# Patient Record
Sex: FEMALE | Race: WHITE | NOT HISPANIC OR LATINO | Employment: FULL TIME | URBAN - METROPOLITAN AREA
[De-identification: names, ages, dates, MRNs, and addresses within clinical notes are randomized per-mention and may not be internally consistent; named-entity substitution may affect disease eponyms.]

---

## 2023-05-17 ENCOUNTER — TELEMEDICINE (OUTPATIENT)
Dept: PRIMARY CARE | Facility: CLINIC | Age: 58
End: 2023-05-17
Payer: COMMERCIAL

## 2023-05-17 VITALS
BODY MASS INDEX: 30 KG/M2 | HEIGHT: 62 IN | DIASTOLIC BLOOD PRESSURE: 83 MMHG | SYSTOLIC BLOOD PRESSURE: 118 MMHG | WEIGHT: 163 LBS

## 2023-05-17 DIAGNOSIS — J06.9 URI, ACUTE: ICD-10-CM

## 2023-05-17 DIAGNOSIS — Z20.818 EXPOSURE TO STREP THROAT: Primary | ICD-10-CM

## 2023-05-17 PROBLEM — D50.9 IRON DEFICIENCY ANEMIA: Status: ACTIVE | Noted: 2023-05-17

## 2023-05-17 PROBLEM — M05.79 RHEUMATOID ARTHRITIS INVOLVING MULTIPLE SITES WITH POSITIVE RHEUMATOID FACTOR (MULTI): Status: ACTIVE | Noted: 2022-08-02

## 2023-05-17 PROBLEM — R79.89 LOW VITAMIN B12 LEVEL: Status: ACTIVE | Noted: 2023-05-17

## 2023-05-17 PROBLEM — G40.909 EPILEPSY (MULTI): Status: ACTIVE | Noted: 2023-05-17

## 2023-05-17 PROBLEM — L40.9 PSORIASIS: Status: ACTIVE | Noted: 2023-05-17

## 2023-05-17 PROBLEM — F32.A ANXIETY AND DEPRESSION: Status: ACTIVE | Noted: 2023-05-17

## 2023-05-17 PROBLEM — I10 HYPERTENSION, ESSENTIAL: Status: ACTIVE | Noted: 2023-05-17

## 2023-05-17 PROBLEM — Z86.2 H/O IRON DEFICIENCY ANEMIA: Status: ACTIVE | Noted: 2023-05-17

## 2023-05-17 PROBLEM — F41.9 ANXIETY AND DEPRESSION: Status: ACTIVE | Noted: 2023-05-17

## 2023-05-17 PROBLEM — E88.819 INSULIN RESISTANCE: Status: ACTIVE | Noted: 2023-05-17

## 2023-05-17 PROBLEM — E55.9 VITAMIN D DEFICIENCY: Status: ACTIVE | Noted: 2023-05-17

## 2023-05-17 PROBLEM — N20.0 CALCULUS OF KIDNEY: Status: ACTIVE | Noted: 2023-05-17

## 2023-05-17 PROCEDURE — 99213 OFFICE O/P EST LOW 20 MIN: CPT | Performed by: NURSE PRACTITIONER

## 2023-05-17 RX ORDER — TOPIRAMATE 200 MG/1
TABLET, COATED ORAL
COMMUNITY

## 2023-05-17 RX ORDER — ALBUTEROL SULFATE 90 UG/1
AEROSOL, METERED RESPIRATORY (INHALATION)
COMMUNITY

## 2023-05-17 RX ORDER — AMOXICILLIN AND CLAVULANATE POTASSIUM 875; 125 MG/1; MG/1
875 TABLET, FILM COATED ORAL 2 TIMES DAILY
Qty: 20 TABLET | Refills: 0 | Status: SHIPPED | OUTPATIENT
Start: 2023-05-17 | End: 2023-05-27

## 2023-05-17 RX ORDER — TOPIRAMATE 50 MG/1
50 TABLET, COATED ORAL 2 TIMES DAILY
COMMUNITY

## 2023-05-17 RX ORDER — LACOSAMIDE 100 MG/1
100 TABLET ORAL 2 TIMES DAILY
Qty: 60 TABLET | Refills: 11 | COMMUNITY
Start: 2023-04-18 | End: 2024-04-17

## 2023-05-17 RX ORDER — RIZATRIPTAN BENZOATE 10 MG/1
TABLET ORAL
COMMUNITY
Start: 2019-06-07

## 2023-05-17 RX ORDER — LEFLUNOMIDE 20 MG/1
20 TABLET ORAL DAILY
COMMUNITY

## 2023-05-17 RX ORDER — UPADACITINIB 15 MG/1
1 TABLET, EXTENDED RELEASE ORAL DAILY
COMMUNITY
Start: 2021-06-03

## 2023-05-17 RX ORDER — VENLAFAXINE HYDROCHLORIDE 75 MG/1
75 CAPSULE, EXTENDED RELEASE ORAL
COMMUNITY
End: 2023-08-15 | Stop reason: DRUGHIGH

## 2023-05-17 RX ORDER — OLMESARTAN MEDOXOMIL 20 MG/1
20 TABLET ORAL 2 TIMES DAILY
COMMUNITY
Start: 2021-09-01 | End: 2023-10-16

## 2023-05-17 RX ORDER — DULAGLUTIDE 0.75 MG/.5ML
INJECTION, SOLUTION SUBCUTANEOUS
COMMUNITY
End: 2024-01-14

## 2023-05-17 ASSESSMENT — PATIENT HEALTH QUESTIONNAIRE - PHQ9
1. LITTLE INTEREST OR PLEASURE IN DOING THINGS: NOT AT ALL
SUM OF ALL RESPONSES TO PHQ9 QUESTIONS 1 AND 2: 0
2. FEELING DOWN, DEPRESSED OR HOPELESS: NOT AT ALL

## 2023-05-17 NOTE — ASSESSMENT & PLAN NOTE
Discussed viral vs bacterial  Discussed typical course of viral illness, hold antibiotic  If no improvement or worsening after 7-10 days, can start augmentin

## 2023-05-17 NOTE — ASSESSMENT & PLAN NOTE
Will give augmentin  If symptoms begins to feel more like strep  Due to being a  and exposure from [employer]

## 2023-05-17 NOTE — PROGRESS NOTES
An interactive audio and video telecommunication system which permits real time communications between the patient (at the originating site) and provider (at the distant site) was utilized to provide this telehealth service.  Verbal consent was requested and obtained from the patient for this telehealth visit.     Subjective   Patient ID: Oly Richard is a 57 y.o. female who presents for Sick visit.    Sx started Monday  Has not taken covid test  Cough  S/T/hoarse voice   Nasal congestion  Fatigue  Not taking any medication  No SOB or wheeze  No CP or palpitations  No Fever  Has exposure to strep  Doesn't feel like strep at this time  But concerned it could be        Review of Systems  ROS completely negative except what was mentioned in the HPI.  Problem List, surgical, social, and family histories which were reviewed and updated as necessary within the EMR. I also personally reviewed the notes, labs, and imaging that pertained to what was documented or discussed in the HPI.      Objective   Physical Exam  Vitals and nursing note reviewed.   Constitutional:       Appearance: Normal appearance.   HENT:      Head: Normocephalic and atraumatic.      Right Ear: External ear normal.      Left Ear: External ear normal.      Nose: Nose normal.      Mouth/Throat:      Mouth: Mucous membranes are moist.   Eyes:      Extraocular Movements: Extraocular movements intact.      Conjunctiva/sclera: Conjunctivae normal.   Pulmonary:      Effort: Pulmonary effort is normal.      Comments: Hoarse voice  Musculoskeletal:      Cervical back: Normal range of motion and neck supple.   Neurological:      General: No focal deficit present.      Mental Status: She is alert and oriented to person, place, and time. Mental status is at baseline.   Psychiatric:         Mood and Affect: Mood normal.         Behavior: Behavior normal.         Thought Content: Thought content normal.         Judgment: Judgment normal.         /83   Ht  "1.575 m (5' 2\")   Wt 73.9 kg (163 lb)   BMI 29.81 kg/m²     Assessment/Plan   Problem List Items Addressed This Visit       Exposure to strep throat - Primary     Will give augmentin  If symptoms begins to feel more like strep  Due to being a  and exposure from [employer] students         Relevant Medications    amoxicillin-pot clavulanate (Augmentin) 875-125 mg tablet    URI, acute     Discussed viral vs bacterial  Discussed typical course of viral illness, hold antibiotic  If no improvement or worsening after 7-10 days, can start augmentin         Relevant Medications    amoxicillin-pot clavulanate (Augmentin) 875-125 mg tablet     "

## 2023-08-11 PROBLEM — D64.9 ANEMIA: Status: ACTIVE | Noted: 2023-08-11

## 2023-08-11 PROBLEM — R73.01 IFG (IMPAIRED FASTING GLUCOSE): Status: ACTIVE | Noted: 2023-08-11

## 2023-08-12 ENCOUNTER — LAB (OUTPATIENT)
Dept: LAB | Facility: LAB | Age: 58
End: 2023-08-12
Payer: COMMERCIAL

## 2023-08-12 DIAGNOSIS — F32.A ANXIETY AND DEPRESSION: ICD-10-CM

## 2023-08-12 DIAGNOSIS — Z00.00 ROUTINE ADULT HEALTH MAINTENANCE: ICD-10-CM

## 2023-08-12 DIAGNOSIS — F41.9 ANXIETY AND DEPRESSION: ICD-10-CM

## 2023-08-12 DIAGNOSIS — D64.9 ANEMIA, UNSPECIFIED TYPE: ICD-10-CM

## 2023-08-12 DIAGNOSIS — R73.01 IFG (IMPAIRED FASTING GLUCOSE): ICD-10-CM

## 2023-08-12 DIAGNOSIS — E55.9 VITAMIN D DEFICIENCY: ICD-10-CM

## 2023-08-12 PROBLEM — J06.9 URI, ACUTE: Status: RESOLVED | Noted: 2023-05-17 | Resolved: 2023-08-12

## 2023-08-12 PROBLEM — N95.1 MENOPAUSAL SYNDROME: Status: ACTIVE | Noted: 2023-08-12

## 2023-08-12 PROBLEM — M06.9 RHEUMATOID ARTHRITIS (MULTI): Status: ACTIVE | Noted: 2022-08-02

## 2023-08-12 PROBLEM — G43.909 MIGRAINE HEADACHE: Status: ACTIVE | Noted: 2023-08-12

## 2023-08-12 PROBLEM — F41.8 MIXED ANXIETY DEPRESSIVE DISORDER: Status: ACTIVE | Noted: 2023-05-17

## 2023-08-12 PROBLEM — D50.9 IRON DEFICIENCY ANEMIA: Status: RESOLVED | Noted: 2023-05-17 | Resolved: 2023-08-12

## 2023-08-12 PROBLEM — Z20.818 EXPOSURE TO STREP THROAT: Status: RESOLVED | Noted: 2023-05-17 | Resolved: 2023-08-12

## 2023-08-12 PROBLEM — F51.04 CHRONIC INSOMNIA: Status: ACTIVE | Noted: 2023-08-12

## 2023-08-12 PROBLEM — E03.9 ACQUIRED HYPOTHYROIDISM: Status: ACTIVE | Noted: 2023-08-12

## 2023-08-12 LAB
ALANINE AMINOTRANSFERASE (SGPT) (U/L) IN SER/PLAS: 17 U/L (ref 7–45)
ALBUMIN (G/DL) IN SER/PLAS: 4.3 G/DL (ref 3.4–5)
ALKALINE PHOSPHATASE (U/L) IN SER/PLAS: 53 U/L (ref 33–110)
ANION GAP IN SER/PLAS: 12 MMOL/L (ref 10–20)
APPEARANCE, URINE: CLEAR
ASPARTATE AMINOTRANSFERASE (SGOT) (U/L) IN SER/PLAS: 20 U/L (ref 9–39)
BACTERIA, URINE: ABNORMAL /HPF
BILIRUBIN TOTAL (MG/DL) IN SER/PLAS: 0.4 MG/DL (ref 0–1.2)
BILIRUBIN, URINE: NEGATIVE
BLOOD, URINE: NEGATIVE
CALCIDIOL (25 OH VITAMIN D3) (NG/ML) IN SER/PLAS: 63 NG/ML
CALCIUM (MG/DL) IN SER/PLAS: 9.1 MG/DL (ref 8.6–10.3)
CARBON DIOXIDE, TOTAL (MMOL/L) IN SER/PLAS: 24 MMOL/L (ref 21–32)
CHLORIDE (MMOL/L) IN SER/PLAS: 111 MMOL/L (ref 98–107)
COBALAMIN (VITAMIN B12) (PG/ML) IN SER/PLAS: 1609 PG/ML (ref 211–911)
COLOR, URINE: YELLOW
CREATININE (MG/DL) IN SER/PLAS: 1.03 MG/DL (ref 0.5–1.05)
ESTIMATED AVERAGE GLUCOSE FOR HBA1C: 105 MG/DL
FERRITIN (UG/LL) IN SER/PLAS: 29 UG/L (ref 8–150)
GFR FEMALE: 63 ML/MIN/1.73M2
GLUCOSE (MG/DL) IN SER/PLAS: 101 MG/DL (ref 74–99)
GLUCOSE, URINE: NEGATIVE MG/DL
HEMOGLOBIN A1C/HEMOGLOBIN TOTAL IN BLOOD: 5.3 %
IRON (UG/DL) IN SER/PLAS: 118 UG/DL (ref 35–150)
IRON BINDING CAPACITY (UG/DL) IN SER/PLAS: 365 UG/DL (ref 240–445)
IRON SATURATION (%) IN SER/PLAS: 32 % (ref 25–45)
KETONES, URINE: NEGATIVE MG/DL
LEUKOCYTE ESTERASE, URINE: ABNORMAL
MUCUS, URINE: ABNORMAL /LPF
NITRITE, URINE: NEGATIVE
PH, URINE: 7 (ref 5–8)
POTASSIUM (MMOL/L) IN SER/PLAS: 4.4 MMOL/L (ref 3.5–5.3)
PROTEIN TOTAL: 6.6 G/DL (ref 6.4–8.2)
PROTEIN, URINE: NEGATIVE MG/DL
RBC, URINE: 1 /HPF (ref 0–5)
SODIUM (MMOL/L) IN SER/PLAS: 143 MMOL/L (ref 136–145)
SPECIFIC GRAVITY, URINE: 1.01 (ref 1–1.03)
SQUAMOUS EPITHELIAL CELLS, URINE: 3 /HPF
THYROTROPIN (MIU/L) IN SER/PLAS BY DETECTION LIMIT <= 0.05 MIU/L: 1.13 MIU/L (ref 0.44–3.98)
UREA NITROGEN (MG/DL) IN SER/PLAS: 12 MG/DL (ref 6–23)
UROBILINOGEN, URINE: <2 MG/DL (ref 0–1.9)
WBC, URINE: 2 /HPF (ref 0–5)

## 2023-08-12 PROCEDURE — 81001 URINALYSIS AUTO W/SCOPE: CPT

## 2023-08-12 PROCEDURE — 80053 COMPREHEN METABOLIC PANEL: CPT

## 2023-08-12 PROCEDURE — 82728 ASSAY OF FERRITIN: CPT

## 2023-08-12 PROCEDURE — 82607 VITAMIN B-12: CPT

## 2023-08-12 PROCEDURE — 84443 ASSAY THYROID STIM HORMONE: CPT

## 2023-08-12 PROCEDURE — 82306 VITAMIN D 25 HYDROXY: CPT

## 2023-08-12 PROCEDURE — 36415 COLL VENOUS BLD VENIPUNCTURE: CPT

## 2023-08-12 PROCEDURE — 83540 ASSAY OF IRON: CPT

## 2023-08-12 PROCEDURE — 83036 HEMOGLOBIN GLYCOSYLATED A1C: CPT

## 2023-08-12 PROCEDURE — 83550 IRON BINDING TEST: CPT

## 2023-08-14 ENCOUNTER — TELEPHONE (OUTPATIENT)
Dept: PRIMARY CARE | Facility: CLINIC | Age: 58
End: 2023-08-14
Payer: COMMERCIAL

## 2023-08-14 NOTE — TELEPHONE ENCOUNTER
Regarding: Labs  Contact: [phone]  ----- Message from Susie Malave MD sent at 8/11/2023  2:58 PM EDT -----  Schedule appt after labs  Not urgent     ----- Message sent from Susie Malave MD to Oly Richard at 8/11/2023  2:58 PM -----   Added some additional labs  Go to any  lab to have done  Will see what they show  Schedule appt if you dont have one scheduled      ----- Message -----       From:Oly Richard       Sent:8/11/2023  2:29 PM EDT         To:Susie Malave MD    Subject:Labs    Good afternoon. I had recent labs done for the rheumatologist and he asked me to have you take a look at the RBC, etc. My blood is low again. Please advise. Thanks! :)  Eliu

## 2023-08-15 ENCOUNTER — OFFICE VISIT (OUTPATIENT)
Dept: PRIMARY CARE | Facility: CLINIC | Age: 58
End: 2023-08-15
Payer: COMMERCIAL

## 2023-08-15 VITALS
TEMPERATURE: 97.7 F | WEIGHT: 168.38 LBS | BODY MASS INDEX: 30.8 KG/M2 | HEART RATE: 87 BPM | DIASTOLIC BLOOD PRESSURE: 84 MMHG | SYSTOLIC BLOOD PRESSURE: 139 MMHG | OXYGEN SATURATION: 97 %

## 2023-08-15 DIAGNOSIS — M06.9 RHEUMATOID ARTHRITIS INVOLVING MULTIPLE SITES, UNSPECIFIED WHETHER RHEUMATOID FACTOR PRESENT (MULTI): ICD-10-CM

## 2023-08-15 DIAGNOSIS — D64.9 ANEMIA, UNSPECIFIED TYPE: Primary | ICD-10-CM

## 2023-08-15 DIAGNOSIS — G40.109 TEMPORAL LOBE EPILEPSY (MULTI): ICD-10-CM

## 2023-08-15 PROCEDURE — 99214 OFFICE O/P EST MOD 30 MIN: CPT | Performed by: INTERNAL MEDICINE

## 2023-08-15 PROCEDURE — 1036F TOBACCO NON-USER: CPT | Performed by: INTERNAL MEDICINE

## 2023-08-15 PROCEDURE — 3079F DIAST BP 80-89 MM HG: CPT | Performed by: INTERNAL MEDICINE

## 2023-08-15 PROCEDURE — 3075F SYST BP GE 130 - 139MM HG: CPT | Performed by: INTERNAL MEDICINE

## 2023-08-15 RX ORDER — VENLAFAXINE HYDROCHLORIDE 75 MG/1
75 CAPSULE, EXTENDED RELEASE ORAL DAILY
Qty: 90 CAPSULE | Refills: 3 | Status: SHIPPED | OUTPATIENT
Start: 2023-08-15 | End: 2023-10-30

## 2023-08-15 RX ORDER — LANOLIN ALCOHOL/MO/W.PET/CERES
1000 CREAM (GRAM) TOPICAL DAILY
Qty: 30 TABLET | Refills: 3
Start: 2023-08-15 | End: 2024-08-14

## 2023-08-15 ASSESSMENT — PATIENT HEALTH QUESTIONNAIRE - PHQ9
2. FEELING DOWN, DEPRESSED OR HOPELESS: NOT AT ALL
SUM OF ALL RESPONSES TO PHQ9 QUESTIONS 1 AND 2: 0
1. LITTLE INTEREST OR PLEASURE IN DOING THINGS: NOT AT ALL

## 2023-08-15 NOTE — PROGRESS NOTES
Chief Complaint/HPI: Had labs done recently  Reviewed  Component      Latest Ref Rng 8/12/2023   GLUCOSE      74 - 99 mg/dL 101 (H)    SODIUM      136 - 145 mmol/L 143    POTASSIUM      3.5 - 5.3 mmol/L 4.4    CHLORIDE      98 - 107 mmol/L 111 (H)    Bicarbonate      21 - 32 mmol/L 24    Anion Gap      10 - 20 mmol/L 12    Blood Urea Nitrogen      6 - 23 mg/dL 12    Creatinine      0.50 - 1.05 mg/dL 1.03    GFR Female      >90 mL/min/1.73m2 63    Calcium      8.6 - 10.3 mg/dL 9.1    Albumin      3.4 - 5.0 g/dL 4.3    Alkaline Phosphatase      33 - 110 U/L 53    Total Protein      6.4 - 8.2 g/dL 6.6    AST      9 - 39 U/L 20    Bilirubin Total      0.0 - 1.2 mg/dL 0.4    ALT      7 - 45 U/L 17    Color, Urine      STRAW,YELLOW  YELLOW    Appearance, Urine      CLEAR  CLEAR    Specific Gravity, Urine      1.005 - 1.035  1.010    pH, Urine      5.0 - 8.0  7.0    Protein, Urine      NEGATIVE mg/dL NEGATIVE    Glucose, Urine      NEGATIVE mg/dL NEGATIVE    Blood, Urine      NEGATIVE  NEGATIVE    Ketones, Urine      NEGATIVE mg/dL NEGATIVE    Bilirubin, Urine      NEGATIVE  NEGATIVE    Urobilinogen, Urine      0.0 - 1.9 mg/dL <2.0    Nitrite, Urine      NEGATIVE  NEGATIVE    Leukocyte Esterase, Urine      NEGATIVE  TRACE !    WBC, Urine      0 - 5 /HPF 2    RBC, Urine      0 - 5 /HPF 1    Squamous Epithelial Cells, Urine      /HPF 3    Bacteria, Urine      /HPF 1+ !    Mucus, Urine      /LPF 1+    IRON      35 - 150 ug/dL 118    TIBC      240 - 445 ug/dL 365    % Saturation      25 - 45 % 32    Hemoglobin A1C      % 5.3    Estimated Average Glucose      MG/    FERRITIN      8 - 150 ug/L 29    Vitamin B12      211 - 911 pg/mL 1609 (H)    Vitamin D, 25-Hydroxy, Total      ng/mL 63    Thyroid Stimulating Hormone      0.44 - 3.98 mIU/L 1.13       Hemoglobin 11.5 - 15.5 g/dL 10.9 Low    Hematocrit 36.0 - 46.0 % 34.6 Low      4/23 labs:  Hemoglobin 11.5 - 15.5 g/dL 11.5   Hematocrit 36.0 - 46.0 % 36.2     Old  labs:  Hemoglobin 12.0 - 16.0 g/dL 11.9 Low  12.1 11.8 Low      Has been taking Vit B supplement daily  Loke dup meds:  Vimpat: Hematologic & oncologic: Anemia   Rinvoq: Upadacitinib inhibits Janus kinase (KERI) enzymes, which are intracellular enzymes involved in stimulating hematopoiesis and immune cell function through a signaling pathway. JAKs activate signal transducers and activators of transcription (STATs), which regulate gene expression and intracellular activity. The inhibition of JAKs prevents the activation of STATs.   Rinvoq: Anemia (7%), leukopenia (1% to 2%), lymphocytopenia (3%), neutropenia (1% to 6%)     No bleeding, not menstruating any more  No black stool  Feels fatigued o/w normal    ASSESSMENT/PLAN  Problem List Items Addressed This Visit          Medium    Anemia - Primary    Relevant Medications    cyanocobalamin (Vitamin B-12) 1,000 mcg tablet    Other Relevant Orders    Haptoglobin    Lactate dehydrogenase    Direct antiglobulin test    Reticulocytes    Folate    Colonoscopy    EGD    CBC    peripheral smear, staff review - Miscellaneous Test    Rheumatoid arthritis (CMS/HCC)    Overview     On Arava (used to take Enbrel, remission) and  Started on Rinvoq 5/21  Managed by Rheum/Dr Ba [facility]         Temporal lobe epilepsy (CMS/[facility])    Overview     Left frontotemporal Typical partial seizures but has had 4-5 Generalized tonic clonic seizures  On Vimpat and Topamax  Managed by Neurology (Dr Gonsalez [facility])              Patient Active Problem List   Diagnosis    Adhesive capsulitis of right shoulder    Mixed anxiety depressive disorder    Calculus of kidney    Epilepsy (CMS/HCC)    H/O iron deficiency anemia    Primary hypertension    Insulin resistance    Low vitamin B12 level    Migraine without aura and without status migrainosus, not intractable    Psoriasis    Rheumatoid arthritis (CMS/HCC)    Temporal lobe epilepsy (CMS/HCC)    Vitamin D deficiency    Anemia    IFG (impaired fasting glucose)     Routine adult health maintenance    Body mass index 30.0-30.9, adult    Migraine headache    Menopausal syndrome    Chronic insomnia    Acquired hypothyroidism       ALLERGIES  Amlodipine, Nebivolol, Sertraline, Sulfa (sulfonamide antibiotics), and Sulfasalazine    MEDICATIONS  Current Outpatient Medications   Medication Instructions    albuterol 90 mcg/actuation inhaler INHALE 1 TO 2 PUFFS EVERY 4 TO 6 HOURS AS NEEDED FOR SHORTNESS OF BREATH    cyanocobalamin (VITAMIN B-12) 1,000 mcg, oral, Daily    lacosamide (VIMPAT) 100 mg, oral, 2 times daily    leflunomide (ARAVA) 20 mg, oral, Daily    olmesartan (BENICAR) 20 mg, oral, 2 times daily    Rinvoq 15 mg tablet extended release 24 hr 1 tablet, oral, Daily    rizatriptan (Maxalt) 10 mg tablet TAKE ONE (1) TABLET EVERY TWO (2) HOURS AS NEEDED FOR HEADACHE. MAXIMUM 30 MG PER DAY.    Topamax 200 mg tablet TAKE 1 TABLET BY MOUTH TWICE DAILY. TAKE WITH 50 MG TABLET (TOTAL 250 MG TWICE DAILY)    Topamax 50 mg, oral, 2 times daily    Trulicity 0.75 mg/0.5 mL pen injector 0.75 MG ONCE WEEKLY    venlafaxine XR (EFFEXOR-XR) 75 mg, oral, Daily        ROS otherwise negative aside from what was mentioned above in HPI.    PHYSICAL EXAM  /84   Pulse 87   Temp 36.5 °C (97.7 °F)   Wt 76.4 kg (168 lb 6 oz)   SpO2 97%   BMI 30.80 kg/m²   Body mass index is 30.8 kg/m².  Gen: Alert, NAD, no KEITH  HEENT:  PERRLA, EOMI, conjunctiva and sclera normal in appearance  Respiratory:  Lungs CTAB  Cardiovascular:  Heart RRR. No M/R/G  Neuro:  Gross motor and sensory intact  Skin:  No suspicious lesions present  Abd: No HSM

## 2023-09-01 ENCOUNTER — TELEPHONE (OUTPATIENT)
Dept: PRIMARY CARE | Facility: CLINIC | Age: 58
End: 2023-09-01
Payer: COMMERCIAL

## 2023-09-01 DIAGNOSIS — D64.9 ANEMIA, UNSPECIFIED TYPE: Primary | ICD-10-CM

## 2023-09-01 DIAGNOSIS — R74.02 ELEVATED LDH: ICD-10-CM

## 2023-09-06 ENCOUNTER — TELEPHONE (OUTPATIENT)
Dept: PRIMARY CARE | Facility: CLINIC | Age: 58
End: 2023-09-06
Payer: COMMERCIAL

## 2023-09-29 DIAGNOSIS — T45.4X5D ADVERSE EFFECT OF IRON, SUBSEQUENT ENCOUNTER: Primary | ICD-10-CM

## 2023-09-29 DIAGNOSIS — E61.1 IRON DEFICIENCY: ICD-10-CM

## 2023-09-29 DIAGNOSIS — D64.9 ANEMIA, UNSPECIFIED TYPE: ICD-10-CM

## 2023-09-29 PROBLEM — T45.4X5A ADVERSE EFFECT OF IRON: Status: ACTIVE | Noted: 2023-09-29

## 2023-09-29 RX ORDER — HEPARIN SODIUM,PORCINE/PF 10 UNIT/ML
50 SYRINGE (ML) INTRAVENOUS AS NEEDED
OUTPATIENT
Start: 2023-09-30

## 2023-09-29 RX ORDER — DIPHENHYDRAMINE HYDROCHLORIDE 50 MG/ML
50 INJECTION INTRAMUSCULAR; INTRAVENOUS AS NEEDED
Status: CANCELLED | OUTPATIENT
Start: 2023-10-19

## 2023-09-29 RX ORDER — FAMOTIDINE 10 MG/ML
20 INJECTION INTRAVENOUS ONCE AS NEEDED
Status: CANCELLED | OUTPATIENT
Start: 2023-10-19

## 2023-09-29 RX ORDER — ALBUTEROL SULFATE 0.83 MG/ML
3 SOLUTION RESPIRATORY (INHALATION) AS NEEDED
Status: CANCELLED | OUTPATIENT
Start: 2023-10-19

## 2023-09-29 RX ORDER — HEPARIN 100 UNIT/ML
500 SYRINGE INTRAVENOUS AS NEEDED
OUTPATIENT
Start: 2023-09-30

## 2023-09-29 RX ORDER — EPINEPHRINE 0.3 MG/.3ML
0.3 INJECTION SUBCUTANEOUS EVERY 5 MIN PRN
Status: CANCELLED | OUTPATIENT
Start: 2023-10-19

## 2023-10-11 PROBLEM — N20.0 BILATERAL NEPHROLITHIASIS: Status: ACTIVE | Noted: 2023-10-11

## 2023-10-11 PROBLEM — R63.5 WEIGHT GAIN: Status: ACTIVE | Noted: 2023-10-11

## 2023-10-11 PROBLEM — J40 BRONCHITIS: Status: ACTIVE | Noted: 2023-10-11

## 2023-10-11 PROBLEM — U07.1 DISEASE DUE TO SEVERE ACUTE RESPIRATORY SYNDROME CORONAVIRUS 2 (SARS-COV-2): Status: ACTIVE | Noted: 2023-10-11

## 2023-10-11 PROBLEM — R39.9 SYMPTOMS INVOLVING URINARY SYSTEM: Status: ACTIVE | Noted: 2023-10-11

## 2023-10-11 PROBLEM — I10 HYPERTENSION, ESSENTIAL: Status: ACTIVE | Noted: 2023-10-11

## 2023-10-11 PROBLEM — E66.01 SEVERE OBESITY (MULTI): Status: ACTIVE | Noted: 2023-10-11

## 2023-10-11 RX ORDER — SODIUM, POTASSIUM,MAG SULFATES 17.5-3.13G
SOLUTION, RECONSTITUTED, ORAL ORAL
COMMUNITY
Start: 2023-08-15 | End: 2023-10-19 | Stop reason: WASHOUT

## 2023-10-11 RX ORDER — NITROFURANTOIN (MACROCRYSTALS) 100 MG/1
100 CAPSULE ORAL EVERY 12 HOURS
COMMUNITY
End: 2023-10-19 | Stop reason: WASHOUT

## 2023-10-11 RX ORDER — PHENAZOPYRIDINE HYDROCHLORIDE 100 MG/1
100 TABLET, FILM COATED ORAL 2 TIMES DAILY
COMMUNITY
End: 2023-10-19 | Stop reason: WASHOUT

## 2023-10-11 RX ORDER — BENZONATATE 100 MG/1
CAPSULE ORAL
COMMUNITY
Start: 2023-09-17 | End: 2023-10-19 | Stop reason: WASHOUT

## 2023-10-11 RX ORDER — MOMETASONE FUROATE 50 UG/1
SPRAY, METERED NASAL
COMMUNITY
Start: 2023-09-17 | End: 2023-10-19 | Stop reason: WASHOUT

## 2023-10-12 ENCOUNTER — ANESTHESIA EVENT (OUTPATIENT)
Dept: GASTROENTEROLOGY | Facility: EXTERNAL LOCATION | Age: 58
End: 2023-10-12

## 2023-10-12 ENCOUNTER — ANESTHESIA (OUTPATIENT)
Dept: GASTROENTEROLOGY | Facility: EXTERNAL LOCATION | Age: 58
End: 2023-10-12

## 2023-10-12 ENCOUNTER — HOSPITAL ENCOUNTER (OUTPATIENT)
Dept: GASTROENTEROLOGY | Facility: EXTERNAL LOCATION | Age: 58
Discharge: HOME | End: 2023-10-12
Payer: COMMERCIAL

## 2023-10-12 VITALS
OXYGEN SATURATION: 99 % | TEMPERATURE: 98.6 F | HEIGHT: 62 IN | BODY MASS INDEX: 30.36 KG/M2 | RESPIRATION RATE: 17 BRPM | WEIGHT: 165 LBS | DIASTOLIC BLOOD PRESSURE: 79 MMHG | SYSTOLIC BLOOD PRESSURE: 126 MMHG | HEART RATE: 80 BPM

## 2023-10-12 DIAGNOSIS — D64.9 ANEMIA, UNSPECIFIED: ICD-10-CM

## 2023-10-12 PROCEDURE — 88305 TISSUE EXAM BY PATHOLOGIST: CPT | Performed by: PATHOLOGY

## 2023-10-12 PROCEDURE — 45378 DIAGNOSTIC COLONOSCOPY: CPT | Performed by: INTERNAL MEDICINE

## 2023-10-12 PROCEDURE — 88305 TISSUE EXAM BY PATHOLOGIST: CPT | Mod: TC | Performed by: INTERNAL MEDICINE

## 2023-10-12 PROCEDURE — 43239 EGD BIOPSY SINGLE/MULTIPLE: CPT | Performed by: INTERNAL MEDICINE

## 2023-10-12 RX ORDER — LIDOCAINE HYDROCHLORIDE 20 MG/ML
INJECTION, SOLUTION EPIDURAL; INFILTRATION; INTRACAUDAL; PERINEURAL AS NEEDED
Status: DISCONTINUED | OUTPATIENT
Start: 2023-10-12 | End: 2023-10-12

## 2023-10-12 RX ORDER — SODIUM CHLORIDE 9 MG/ML
30 INJECTION, SOLUTION INTRAVENOUS CONTINUOUS
Status: DISCONTINUED | OUTPATIENT
Start: 2023-10-12 | End: 2023-10-20 | Stop reason: HOSPADM

## 2023-10-12 RX ORDER — SODIUM CHLORIDE, SODIUM LACTATE, POTASSIUM CHLORIDE, CALCIUM CHLORIDE 600; 310; 30; 20 MG/100ML; MG/100ML; MG/100ML; MG/100ML
20 INJECTION, SOLUTION INTRAVENOUS CONTINUOUS
Status: DISCONTINUED | OUTPATIENT
Start: 2023-10-12 | End: 2023-10-20 | Stop reason: HOSPADM

## 2023-10-12 RX ORDER — PROPOFOL 10 MG/ML
INJECTION, EMULSION INTRAVENOUS AS NEEDED
Status: DISCONTINUED | OUTPATIENT
Start: 2023-10-12 | End: 2023-10-12

## 2023-10-12 RX ADMIN — LIDOCAINE HYDROCHLORIDE 50 MG: 20 INJECTION, SOLUTION EPIDURAL; INFILTRATION; INTRACAUDAL; PERINEURAL at 12:44

## 2023-10-12 RX ADMIN — PROPOFOL 50 MG: 10 INJECTION, EMULSION INTRAVENOUS at 12:55

## 2023-10-12 RX ADMIN — PROPOFOL 50 MG: 10 INJECTION, EMULSION INTRAVENOUS at 12:51

## 2023-10-12 RX ADMIN — SODIUM CHLORIDE: 9 INJECTION, SOLUTION INTRAVENOUS at 12:32

## 2023-10-12 RX ADMIN — PROPOFOL 200 MG: 10 INJECTION, EMULSION INTRAVENOUS at 12:44

## 2023-10-12 RX ADMIN — PROPOFOL 50 MG: 10 INJECTION, EMULSION INTRAVENOUS at 13:01

## 2023-10-12 SDOH — HEALTH STABILITY: MENTAL HEALTH: CURRENT SMOKER: 0

## 2023-10-12 ASSESSMENT — PAIN - FUNCTIONAL ASSESSMENT
PAIN_FUNCTIONAL_ASSESSMENT: VAS (VISUAL ANALOG SCALE)
PAIN_FUNCTIONAL_ASSESSMENT: 0-10

## 2023-10-12 ASSESSMENT — PAIN SCALES - GENERAL
PAINLEVEL_OUTOF10: 0 - NO PAIN
PAIN_LEVEL: 0
PAINLEVEL_OUTOF10: 0 - NO PAIN

## 2023-10-12 NOTE — ANESTHESIA PREPROCEDURE EVALUATION
Patient: Oly Richard    Procedure Information       Date/Time: 10/12/23 1230    Scheduled providers: Maira Manley MD; Jessica Zhang RN; Treasure Brandon MA    Procedures:       COLONOSCOPY      EGD    Location: [facility]            Relevant Problems   Cardiovascular   (+) Hypertension, essential   (+) Primary hypertension      Endocrine   (+) Acquired hypothyroidism   (+) Severe obesity (CMS/HCC)      /Renal   (+) Bilateral nephrolithiasis   (+) Calculus of kidney      Neuro/Psych   (+) Epilepsy (CMS/HCC)   (+) Mixed anxiety depressive disorder   (+) Temporal lobe epilepsy (CMS/HCC)      Hematology   (+) Anemia      Musculoskeletal   (+) Rheumatoid arthritis (CMS/HCC)      Infectious Disease   (+) Disease due to severe acute respiratory syndrome coronavirus 2 (SARS-CoV-2)      Other   (+) Adhesive capsulitis of right shoulder   (+) Rheumatoid arthritis (CMS/HCC)       Clinical information reviewed:   Tobacco  Allergies  Meds   Med Hx  Surg Hx  OB Status  Fam Hx  Soc   Hx        NPO Detail:  NPO/Void Status  Carbonhydrate Drink Given Prior to Surgery? : N  Date of Last Liquid: 10/12/23  Time of Last Liquid: 0730  Date of Last Solid: 10/10/23  Time of Last Solid: 1800  Last Intake Type: Clear fluids  Time of Last Void: 1203         Physical Exam    Airway  Mallampati: II     Cardiovascular - normal exam  Rhythm: regular  Rate: normal     Dental - normal exam     Pulmonary - normal exam  Breath sounds clear to auscultation     Abdominal   Abdomen: soft  Bowel sounds: normal           Anesthesia Plan    ASA 3     MAC     The patient is not a current smoker.  Patient was not previously instructed to abstain from smoking on day of procedure.  Patient did not smoke on day of procedure.    Anesthetic plan and risks discussed with patient.  Use of blood products discussed with patient who.    Plan discussed with CRNA.    Samira Bryan, APRN-CRNA, DNP

## 2023-10-12 NOTE — ANESTHESIA POSTPROCEDURE EVALUATION
Patient: Oly Richard    Procedure Summary       Date: 10/12/23 Room / Location: [facility]    Anesthesia Start: 1232 Anesthesia Stop: 1315    Procedures:       COLONOSCOPY      EGD Diagnosis: Anemia, unspecified    Scheduled Providers: Maira Manley MD; Jessica Zhang RN; Treasure Brandon MA Responsible Provider: CAROLYN Olivares DNP    Anesthesia Type: MAC ASA Status: 3            Anesthesia Type: MAC    Vitals Value Taken Time   /67 10/12/23 1308   Temp 37 °C (98.6 °F) 10/12/23 1316   Pulse 83 10/12/23 1316   Resp 16 10/12/23 1316   SpO2 97 % 10/12/23 1316       Anesthesia Post Evaluation    Patient location during evaluation: bedside  Level of consciousness: awake and alert  Pain score: 0  Pain management: adequate  Airway patency: patent  Cardiovascular status: acceptable  Respiratory status: acceptable  Hydration status: acceptable    Uneventful MAC anesthetic  No notable events documented.    CAROLYN Olivares DNP

## 2023-10-12 NOTE — Clinical Note
Abdominal pressure applied.
Patient tolerated the procedure well and is comfortable with no complaints of pain.  Abdomin soft non distended. Vital signs stable. Arousable prior to transport. Patient transported to PACU via stretcher. Handoff completed. 
Quality 47: Advance Care Plan: Advance Care Planning discussed and documented; advance care plan or surrogate decision maker documented in the medical record.
Quality 226: Preventive Care And Screening: Tobacco Use: Screening And Cessation Intervention: Patient screened for tobacco use and is an ex/non-smoker
Detail Level: Simple

## 2023-10-12 NOTE — H&P
Outpatient Hospital Procedure    Patient Profile-Procedures  Initial Info  Patient Demographics  Name Oly Richard  Date of Birth 1965  MRN [id number]  Address   [address] [id number] [place] [id number]    Primary Phone Number [phone]  Secondary Phone Number    Susie Powell    Procedures   EGD   and Colonoscopy      Indication:  anemia    Primary contact name and number   Extended Emergency Contact Information  Primary Emergency Contact: Edward Richard  Home Phone: [phone]  Relation: Spouse  Secondary Emergency Contact: Gigi Zafar  Address: [address] [place], [place] [id number] [facility]  Home Phone: [phone]  Work Phone: [phone]  Mobile Phone: [phone]  Relation: Child    General Health  Weight   Vitals:    10/12/23 1202   Weight: 74.8 kg (165 lb)     BMI Body mass index is 30.18 kg/m².    Allergies  Allergies   Allergen Reactions    Amlodipine Unknown    Dulaglutide Other    Nebivolol Unknown    Sertraline Other     Made her feel weird, weepy    Sulfa (Sulfonamide Antibiotics) Nausea Only    Sulfasalazine Diarrhea     made her feel sick       Past Medical History   Past Medical History:   Diagnosis Date    Anemia     Anxiety     Asthma     Depression     Epilepsy (CMS/HCC)     H/O chest x-ray     10/16: normal 11/22: normal    Hypertension        Provider assessment  Diagnosis  Medication Reviewed - yes  Prior to Admission medications    Medication Sig Start Date End Date Taking? Authorizing Provider   cyanocobalamin (Vitamin B-12) 1,000 mcg tablet Take 1 tablet (1,000 mcg) by mouth once daily. 8/15/23 8/14/24 Yes Susie Malave MD   lacosamide (Vimpat) 100 mg tablet Take 1 tablet (100 mg) by mouth twice a day. 4/18/23 4/17/24 Yes Historical Provider, MD   leflunomide (Arava) 20 mg tablet Take 1 tablet (20 mg) by mouth once daily.   Yes Historical Provider, MD   mometasone (Nasonex) 50 mcg/actuation nasal spray  9/17/23  Yes Historical  Provider, MD   olmesartan (BENIcar) 20 mg tablet Take 1 tablet (20 mg) by mouth twice a day. 9/1/21  Yes Historical Provider, MD   Rinvoq 15 mg tablet extended release 24 hr Take 1 tablet (15 mg) by mouth once daily. 6/3/21  Yes Historical Provider, MD   sodium,potassium,mag sulfates (Suprep) 17.5-3.13-1.6 gram recon soln solution Take by mouth. 8/15/23  Yes Historical Provider, MD   Topamax 200 mg tablet TAKE 1 TABLET BY MOUTH TWICE DAILY. TAKE WITH 50 MG TABLET (TOTAL 250 MG TWICE DAILY)   Yes Historical Provider, MD   Topamax 50 mg tablet Take 1 tablet (50 mg) by mouth 2 times a day.   Yes Historical Provider, MD   venlafaxine XR (Effexor-XR) 75 mg 24 hr capsule Take 1 capsule (75 mg) by mouth once daily. 8/15/23  Yes Susie Malave MD   albuterol 90 mcg/actuation inhaler INHALE 1 TO 2 PUFFS EVERY 4 TO 6 HOURS AS NEEDED FOR SHORTNESS OF BREATH    Historical Provider, MD   benzonatate (Tessalon) 100 mg capsule  9/17/23   Historical Provider, MD   dulaglutide (Trulicity) 1.5 mg/0.5 mL pen injector injection Inject 1.5 mg under the skin 1 (one) time per week. 10/3/22   Historical Provider, MD   nitrofurantoin (Macrodantin) 100 mg capsule Take 1 capsule (100 mg) by mouth every 12 hours.    Historical Provider, MD   phenazopyridine (Pyridium) 100 mg tablet Take 1 tablet (100 mg) by mouth 2 times a day.    Historical Provider, MD   rizatriptan (Maxalt) 10 mg tablet TAKE ONE (1) TABLET EVERY TWO (2) HOURS AS NEEDED FOR HEADACHE. MAXIMUM 30 MG PER DAY. 6/7/19   Historical Provider, MD   Trulicity 0.75 mg/0.5 mL pen injector 0.75 MG ONCE WEEKLY    Historical Provider, MD       This is my H&P    Physical Exam  Physical Exam  Constitutional:       Comments: Awake   HENT:      Head: Normocephalic.   Cardiovascular:      Rate and Rhythm: Normal rate and regular rhythm.   Pulmonary:      Effort: Pulmonary effort is normal.      Breath sounds: Normal breath sounds.   Abdominal:      General: Bowel sounds are normal.       Palpations: Abdomen is soft.   Neurological:      Mental Status: She is alert.   Psychiatric:         Mood and Affect: Mood normal.           Oropharyngeal Classification II (hard and soft palate, upper portion of tonsils anduvula visible)  ASA PS Classification 3  Sedation Plan Deep  Procedure Plan - pre-procedural (re)assesment completed by physician:  discharge/transfer patient when discharge criteria met    Maira Manley MD  10/12/2023 12:38 PM

## 2023-10-12 NOTE — DISCHARGE INSTRUCTIONS
Patient Instructions after a Colonoscopy      The anesthetics, sedatives or narcotics which were given to you today will be acting in your body for the next 24 hours, so you might feel a little sleepy or groggy.  This feeling should slowly wear off. Carefully read and follow the instructions.     You received sedation today:  - Do not drive or operate any machinery or power tools of any kind.   - No alcoholic beverages today, not even beer or wine.  - Do not make any important decisions or sign any legal documents.  - No over the counter medications that contain alcohol or that may cause drowsiness.  - Do not make any important decisions or sign any legal documents.    While it is common to experience mild to moderate abdominal distention, gas, or belching after your procedure, if any of these symptoms occur following discharge from the GI Lab or within one week of having your procedure, call the [facility] to be advised whether a visit to your nearest Urgent Care or Emergency Department is indicated.  Take this paper with you if you go.     - If you develop an allergic reaction to the medications that were given during your procedure such as difficulty breathing, rash, hives, severe nausea, vomiting or lightheadedness.  - If you experience chest pain, shortness of breath, severe abdominal pain, fevers and chills.  -If you develop signs and symptoms of bleeding such as blood in your spit, if your stools turn black, tarry, or bloody  - If you have not urinated within 8 hours following your procedure.  - If your IV site becomes painful, red, inflamed, or looks infected.    If you received a biopsy/polypectomy/sphincterotomy the following instructions apply below:    __ Do not use Aspirin containing products, non-steroidal medications or anti-coagulants for one week following your procedure. (Examples of these types of medications are: Advil, Arthrotec, Aleve, Coumadin, Ecotrin, Heparin, Ibuprofen,  Indocin, Motrin, Naprosyn, Nuprin, Plavix, Vioxx, and Voltarin, or their generic forms.  This list is not all-inclusive.  Check with your physician or pharmacist before resuming medications.)   __ Eat a soft diet today.  Avoid foods that are poorly digested for the next 24 hours.  These foods would include: nuts, beans, lettuce, red meats, and fried foods. Start with liquids and advance your diet as tolerated, gradually work up to eating solids.   __ Do not have a Barium Study or Enema for one week.    Your physician recommends the additional following instructions:    -You have a contact number available for emergencies. The signs and symptoms of potential delayed complications were discussed with you. You may return to normal activities tomorrow.  -Resume your previous diet.  -Continue your present medications.   -We are waiting for your pathology results.  -Your physician has recommended a repeat colonoscopy (date to be determined after pending pathology results are reviewed) for surveillance based on pathology results.  -The findings and recommendations have been discussed with you.  -The findings and recommendations were discussed with your family.  - Please see Medication Reconciliation Form for new medication/medications prescribed.       If you experience any problems or have any questions following discharge from the GI Lab, please call:      [phone]

## 2023-10-15 DIAGNOSIS — I10 ESSENTIAL (PRIMARY) HYPERTENSION: ICD-10-CM

## 2023-10-16 RX ORDER — OLMESARTAN MEDOXOMIL 20 MG/1
20 TABLET ORAL 2 TIMES DAILY
Qty: 180 TABLET | Refills: 3 | Status: SHIPPED | OUTPATIENT
Start: 2023-10-16

## 2023-10-19 ENCOUNTER — APPOINTMENT (OUTPATIENT)
Dept: HEMATOLOGY/ONCOLOGY | Facility: CLINIC | Age: 58
End: 2023-10-19
Payer: COMMERCIAL

## 2023-10-19 ENCOUNTER — INFUSION (OUTPATIENT)
Dept: HEMATOLOGY/ONCOLOGY | Facility: CLINIC | Age: 58
End: 2023-10-19
Payer: COMMERCIAL

## 2023-10-19 VITALS
DIASTOLIC BLOOD PRESSURE: 85 MMHG | WEIGHT: 168.87 LBS | HEIGHT: 60 IN | SYSTOLIC BLOOD PRESSURE: 142 MMHG | OXYGEN SATURATION: 97 % | RESPIRATION RATE: 16 BRPM | HEART RATE: 83 BPM | TEMPERATURE: 98.1 F | BODY MASS INDEX: 33.15 KG/M2

## 2023-10-19 DIAGNOSIS — D64.9 ANEMIA, UNSPECIFIED TYPE: ICD-10-CM

## 2023-10-19 DIAGNOSIS — E61.1 IRON DEFICIENCY: ICD-10-CM

## 2023-10-19 DIAGNOSIS — T45.4X5D ADVERSE EFFECT OF IRON, SUBSEQUENT ENCOUNTER: ICD-10-CM

## 2023-10-19 PROCEDURE — 96365 THER/PROPH/DIAG IV INF INIT: CPT

## 2023-10-19 PROCEDURE — 2500000004 HC RX 250 GENERAL PHARMACY W/ HCPCS (ALT 636 FOR OP/ED): Performed by: PHYSICIAN ASSISTANT

## 2023-10-19 RX ORDER — FAMOTIDINE 10 MG/ML
20 INJECTION INTRAVENOUS ONCE AS NEEDED
Status: CANCELLED | OUTPATIENT
Start: 2023-10-26

## 2023-10-19 RX ORDER — EPINEPHRINE 0.3 MG/.3ML
0.3 INJECTION SUBCUTANEOUS EVERY 5 MIN PRN
Status: DISCONTINUED | OUTPATIENT
Start: 2023-10-19 | End: 2023-10-19 | Stop reason: HOSPADM

## 2023-10-19 RX ORDER — ALBUTEROL SULFATE 0.83 MG/ML
3 SOLUTION RESPIRATORY (INHALATION) AS NEEDED
Status: DISCONTINUED | OUTPATIENT
Start: 2023-10-19 | End: 2023-10-19 | Stop reason: HOSPADM

## 2023-10-19 RX ORDER — ALBUTEROL SULFATE 0.83 MG/ML
3 SOLUTION RESPIRATORY (INHALATION) AS NEEDED
Status: CANCELLED | OUTPATIENT
Start: 2023-10-26

## 2023-10-19 RX ORDER — VIT C/E/ZN/COPPR/LUTEIN/ZEAXAN 250MG-90MG
25 CAPSULE ORAL DAILY
COMMUNITY

## 2023-10-19 RX ORDER — FAMOTIDINE 10 MG/ML
20 INJECTION INTRAVENOUS ONCE AS NEEDED
Status: DISCONTINUED | OUTPATIENT
Start: 2023-10-19 | End: 2023-10-19 | Stop reason: HOSPADM

## 2023-10-19 RX ORDER — EPINEPHRINE 0.3 MG/.3ML
0.3 INJECTION SUBCUTANEOUS EVERY 5 MIN PRN
Status: CANCELLED | OUTPATIENT
Start: 2023-10-26

## 2023-10-19 RX ORDER — DIPHENHYDRAMINE HYDROCHLORIDE 50 MG/ML
50 INJECTION INTRAMUSCULAR; INTRAVENOUS AS NEEDED
Status: DISCONTINUED | OUTPATIENT
Start: 2023-10-19 | End: 2023-10-19 | Stop reason: HOSPADM

## 2023-10-19 RX ORDER — DIPHENHYDRAMINE HYDROCHLORIDE 50 MG/ML
50 INJECTION INTRAMUSCULAR; INTRAVENOUS AS NEEDED
Status: CANCELLED | OUTPATIENT
Start: 2023-10-26

## 2023-10-19 RX ADMIN — IRON SUCROSE 300 MG: 20 INJECTION, SOLUTION INTRAVENOUS at 14:32

## 2023-10-19 ASSESSMENT — PATIENT HEALTH QUESTIONNAIRE - PHQ9
7. TROUBLE CONCENTRATING ON THINGS, SUCH AS READING THE NEWSPAPER OR WATCHING TELEVISION: 0
SUM OF ALL RESPONSES TO PHQ QUESTIONS 1-9: 3
1. LITTLE INTEREST OR PLEASURE IN DOING THINGS: NOT AT ALL
8. MOVING OR SPEAKING SO SLOWLY THAT OTHER PEOPLE COULD HAVE NOTICED. OR THE OPPOSITE, BEING SO FIGETY OR RESTLESS THAT YOU HAVE BEEN MOVING AROUND A LOT MORE THAN USUAL: NOT AT ALL
2. FEELING DOWN, DEPRESSED, IRRITABLE, OR HOPELESS: NOT AT ALL
4. FEELING TIRED OR HAVING LITTLE ENERGY: MORE THAN HALF THE DAYS
6. FEELING BAD ABOUT YOURSELF - OR THAT YOU ARE A FAILURE OR HAVE LET YOURSELF OR YOUR FAMILY DOWN: 0
10. IF YOU CHECKED OFF ANY PROBLEMS, HOW DIFFICULT HAVE THESE PROBLEMS MADE IT FOR YOU TO DO YOUR WORK, TAKE CARE OF THINGS AT HOME, OR GET ALONG WITH OTHER PEOPLE: SOMEWHAT DIFFICULT
7. TROUBLE CONCENTRATING ON THINGS, SUCH AS READING THE NEWSPAPER OR WATCHING TELEVISION: NOT AT ALL
6. FEELING BAD ABOUT YOURSELF - OR THAT YOU ARE A FAILURE OR HAVE LET YOURSELF OR YOUR FAMILY DOWN: NOT AT ALL
9. THOUGHTS THAT YOU WOULD BE BETTER OFF DEAD, OR OF HURTING YOURSELF: 0
4. FEELING TIRED OR HAVING LITTLE ENERGY: MORE THAN HALF THE DAYS
10. IF YOU CHECKED OFF ANY PROBLEMS, HOW DIFFICULT HAVE THESE PROBLEMS MADE IT FOR YOU TO DO YOUR WORK, TAKE CARE OF THINGS AT HOME, OR GET ALONG WITH OTHER PEOPLE: SOMEWHAT DIFFICULT
2. FEELING DOWN, DEPRESSED OR HOPELESS: NOT AT ALL
9. THOUGHTS THAT YOU WOULD BE BETTER OFF DEAD, OR OF HURTING YOURSELF: NOT AT ALL
3. TROUBLE FALLING OR STAYING ASLEEP OR SLEEPING TOO MUCH: 0
3. TROUBLE FALLING OR STAYING ASLEEP OR SLEEPING TOO MUCH: NOT AT ALL
5. POOR APPETITE OR OVEREATING: SEVERAL DAYS
6. FEELING BAD ABOUT YOURSELF - OR THAT YOU ARE A FAILURE OR HAVE LET YOURSELF OR YOUR FAMILY DOWN: NOT AT ALL
9. THOUGHTS THAT YOU WOULD BE BETTER OFF DEAD, OR OF HURTING YOURSELF: NOT AT ALL
5. POOR APPETITE OR OVEREATING: SEVERAL DAYS
2. FEELING DOWN, DEPRESSED, IRRITABLE, OR HOPELESS: 0
1. LITTLE INTEREST OR PLEASURE IN DOING THINGS: NOT AT ALL
1. LITTLE INTEREST OR PLEASURE IN DOING THINGS: NOT AT ALL
2. FEELING DOWN, DEPRESSED OR HOPELESS: NOT AT ALL
4. FEELING TIRED OR HAVING LITTLE ENERGY: 2
SUM OF ALL RESPONSES TO PHQ QUESTIONS 1-9: 3
7. TROUBLE CONCENTRATING ON THINGS, SUCH AS READING THE NEWSPAPER OR WATCHING TELEVISION: NOT AT ALL
3. TROUBLE FALLING OR STAYING ASLEEP OR SLEEPING TOO MUCH: NOT AT ALL
SUM OF ALL RESPONSES TO PHQ9 QUESTIONS 1 AND 2: 0
5. POOR APPETITE OR OVEREATING: 1
1. LITTLE INTEREST OR PLEASURE IN DOING THINGS: 0
8. MOVING OR SPEAKING SO SLOWLY THAT OTHER PEOPLE COULD HAVE NOTICED. OR THE OPPOSITE, BEING SO FIGETY OR RESTLESS THAT YOU HAVE BEEN MOVING AROUND A LOT MORE THAN USUAL: 0
8. MOVING OR SPEAKING SO SLOWLY THAT OTHER PEOPLE COULD HAVE NOTICED. OR THE OPPOSITE, BEING SO FIGETY OR RESTLESS THAT YOU HAVE BEEN MOVING AROUND A LOT MORE THAN USUAL: NOT AT ALL

## 2023-10-19 ASSESSMENT — COLUMBIA-SUICIDE SEVERITY RATING SCALE - C-SSRS
1. IN THE PAST MONTH, HAVE YOU WISHED YOU WERE DEAD OR WISHED YOU COULD GO TO SLEEP AND NOT WAKE UP?: NO
2. HAVE YOU ACTUALLY HAD ANY THOUGHTS OF KILLING YOURSELF?: NO
6. HAVE YOU EVER DONE ANYTHING, STARTED TO DO ANYTHING, OR PREPARED TO DO ANYTHING TO END YOUR LIFE?: NO

## 2023-10-19 ASSESSMENT — PAIN SCALES - GENERAL: PAINLEVEL: 0-NO PAIN

## 2023-10-24 ENCOUNTER — INFUSION (OUTPATIENT)
Dept: HEMATOLOGY/ONCOLOGY | Facility: CLINIC | Age: 58
End: 2023-10-24
Payer: COMMERCIAL

## 2023-10-24 VITALS
SYSTOLIC BLOOD PRESSURE: 130 MMHG | OXYGEN SATURATION: 95 % | RESPIRATION RATE: 16 BRPM | BODY MASS INDEX: 32.43 KG/M2 | WEIGHT: 168.65 LBS | DIASTOLIC BLOOD PRESSURE: 90 MMHG | HEART RATE: 64 BPM | TEMPERATURE: 97.2 F

## 2023-10-24 DIAGNOSIS — E61.1 IRON DEFICIENCY: ICD-10-CM

## 2023-10-24 DIAGNOSIS — T45.4X5D ADVERSE EFFECT OF IRON, SUBSEQUENT ENCOUNTER: ICD-10-CM

## 2023-10-24 DIAGNOSIS — D64.9 ANEMIA, UNSPECIFIED TYPE: ICD-10-CM

## 2023-10-24 PROCEDURE — 96365 THER/PROPH/DIAG IV INF INIT: CPT | Mod: INF

## 2023-10-24 PROCEDURE — 2500000004 HC RX 250 GENERAL PHARMACY W/ HCPCS (ALT 636 FOR OP/ED): Performed by: PHYSICIAN ASSISTANT

## 2023-10-24 PROCEDURE — 96366 THER/PROPH/DIAG IV INF ADDON: CPT | Mod: INF

## 2023-10-24 RX ORDER — DIPHENHYDRAMINE HYDROCHLORIDE 50 MG/ML
50 INJECTION INTRAMUSCULAR; INTRAVENOUS AS NEEDED
OUTPATIENT
Start: 2023-10-26

## 2023-10-24 RX ORDER — FAMOTIDINE 10 MG/ML
20 INJECTION INTRAVENOUS ONCE AS NEEDED
OUTPATIENT
Start: 2023-10-26

## 2023-10-24 RX ORDER — FAMOTIDINE 10 MG/ML
20 INJECTION INTRAVENOUS ONCE AS NEEDED
Status: DISCONTINUED | OUTPATIENT
Start: 2023-10-24 | End: 2023-10-24 | Stop reason: HOSPADM

## 2023-10-24 RX ORDER — ALBUTEROL SULFATE 0.83 MG/ML
3 SOLUTION RESPIRATORY (INHALATION) AS NEEDED
Status: DISCONTINUED | OUTPATIENT
Start: 2023-10-24 | End: 2023-10-24 | Stop reason: HOSPADM

## 2023-10-24 RX ORDER — ALBUTEROL SULFATE 0.83 MG/ML
3 SOLUTION RESPIRATORY (INHALATION) AS NEEDED
OUTPATIENT
Start: 2023-10-26

## 2023-10-24 RX ORDER — EPINEPHRINE 0.3 MG/.3ML
0.3 INJECTION SUBCUTANEOUS EVERY 5 MIN PRN
OUTPATIENT
Start: 2023-10-26

## 2023-10-24 RX ORDER — DIPHENHYDRAMINE HYDROCHLORIDE 50 MG/ML
50 INJECTION INTRAMUSCULAR; INTRAVENOUS AS NEEDED
Status: DISCONTINUED | OUTPATIENT
Start: 2023-10-24 | End: 2023-10-24 | Stop reason: HOSPADM

## 2023-10-24 RX ORDER — EPINEPHRINE 0.3 MG/.3ML
0.3 INJECTION SUBCUTANEOUS EVERY 5 MIN PRN
Status: DISCONTINUED | OUTPATIENT
Start: 2023-10-24 | End: 2023-10-24 | Stop reason: HOSPADM

## 2023-10-24 RX ADMIN — IRON SUCROSE 300 MG: 20 INJECTION, SOLUTION INTRAVENOUS at 13:35

## 2023-10-24 ASSESSMENT — PATIENT HEALTH QUESTIONNAIRE - PHQ9
2. FEELING DOWN, DEPRESSED, IRRITABLE, OR HOPELESS: NOT AT ALL
1. LITTLE INTEREST OR PLEASURE IN DOING THINGS: NOT AT ALL
8. MOVING OR SPEAKING SO SLOWLY THAT OTHER PEOPLE COULD HAVE NOTICED. OR THE OPPOSITE, BEING SO FIGETY OR RESTLESS THAT YOU HAVE BEEN MOVING AROUND A LOT MORE THAN USUAL: NOT AT ALL
2. FEELING DOWN, DEPRESSED, IRRITABLE, OR HOPELESS: 0
4. FEELING TIRED OR HAVING LITTLE ENERGY: NOT AT ALL
6. FEELING BAD ABOUT YOURSELF - OR THAT YOU ARE A FAILURE OR HAVE LET YOURSELF OR YOUR FAMILY DOWN: NOT AT ALL
3. TROUBLE FALLING OR STAYING ASLEEP OR SLEEPING TOO MUCH: NOT AT ALL
6. FEELING BAD ABOUT YOURSELF - OR THAT YOU ARE A FAILURE OR HAVE LET YOURSELF OR YOUR FAMILY DOWN: NOT AT ALL
6. FEELING BAD ABOUT YOURSELF - OR THAT YOU ARE A FAILURE OR HAVE LET YOURSELF OR YOUR FAMILY DOWN: 0
8. MOVING OR SPEAKING SO SLOWLY THAT OTHER PEOPLE COULD HAVE NOTICED. OR THE OPPOSITE, BEING SO FIGETY OR RESTLESS THAT YOU HAVE BEEN MOVING AROUND A LOT MORE THAN USUAL: 0
2. FEELING DOWN, DEPRESSED OR HOPELESS: NOT AT ALL
5. POOR APPETITE OR OVEREATING: NOT AT ALL
4. FEELING TIRED OR HAVING LITTLE ENERGY: 0
5. POOR APPETITE OR OVEREATING: 0
4. FEELING TIRED OR HAVING LITTLE ENERGY: NOT AT ALL
9. THOUGHTS THAT YOU WOULD BE BETTER OFF DEAD, OR OF HURTING YOURSELF: 0
1. LITTLE INTEREST OR PLEASURE IN DOING THINGS: NOT AT ALL
9. THOUGHTS THAT YOU WOULD BE BETTER OFF DEAD, OR OF HURTING YOURSELF: NOT AT ALL
9. THOUGHTS THAT YOU WOULD BE BETTER OFF DEAD, OR OF HURTING YOURSELF: NOT AT ALL
7. TROUBLE CONCENTRATING ON THINGS, SUCH AS READING THE NEWSPAPER OR WATCHING TELEVISION: NOT AT ALL
7. TROUBLE CONCENTRATING ON THINGS, SUCH AS READING THE NEWSPAPER OR WATCHING TELEVISION: NOT AT ALL
8. MOVING OR SPEAKING SO SLOWLY THAT OTHER PEOPLE COULD HAVE NOTICED. OR THE OPPOSITE, BEING SO FIGETY OR RESTLESS THAT YOU HAVE BEEN MOVING AROUND A LOT MORE THAN USUAL: NOT AT ALL
3. TROUBLE FALLING OR STAYING ASLEEP OR SLEEPING TOO MUCH: 0
1. LITTLE INTEREST OR PLEASURE IN DOING THINGS: 0
SUM OF ALL RESPONSES TO PHQ QUESTIONS 1-9: 0
5. POOR APPETITE OR OVEREATING: NOT AT ALL
SUM OF ALL RESPONSES TO PHQ QUESTIONS 1-9: 0
7. TROUBLE CONCENTRATING ON THINGS, SUCH AS READING THE NEWSPAPER OR WATCHING TELEVISION: 0
3. TROUBLE FALLING OR STAYING ASLEEP OR SLEEPING TOO MUCH: NOT AT ALL

## 2023-10-24 ASSESSMENT — PAIN SCALES - GENERAL: PAINLEVEL: 0-NO PAIN

## 2023-10-26 LAB
LABORATORY COMMENT REPORT: NORMAL
PATH REPORT.FINAL DX SPEC: NORMAL
PATH REPORT.GROSS SPEC: NORMAL
PATH REPORT.TOTAL CANCER: NORMAL

## 2023-10-27 ENCOUNTER — APPOINTMENT (OUTPATIENT)
Dept: PRIMARY CARE | Facility: CLINIC | Age: 58
End: 2023-10-27
Payer: COMMERCIAL

## 2023-10-30 DIAGNOSIS — F32.A DEPRESSION, UNSPECIFIED: ICD-10-CM

## 2023-10-30 DIAGNOSIS — F41.9 ANXIETY DISORDER, UNSPECIFIED: ICD-10-CM

## 2023-10-30 RX ORDER — VENLAFAXINE HYDROCHLORIDE 75 MG/1
75 CAPSULE, EXTENDED RELEASE ORAL DAILY
Qty: 90 CAPSULE | Refills: 3 | Status: SHIPPED | OUTPATIENT
Start: 2023-10-30

## 2023-11-01 ENCOUNTER — OFFICE VISIT (OUTPATIENT)
Dept: PRIMARY CARE | Facility: CLINIC | Age: 58
End: 2023-11-01
Payer: COMMERCIAL

## 2023-11-01 ENCOUNTER — LAB (OUTPATIENT)
Dept: LAB | Facility: LAB | Age: 58
End: 2023-11-01
Payer: COMMERCIAL

## 2023-11-01 VITALS
OXYGEN SATURATION: 98 % | HEART RATE: 79 BPM | HEIGHT: 61 IN | WEIGHT: 166.5 LBS | TEMPERATURE: 97.2 F | DIASTOLIC BLOOD PRESSURE: 75 MMHG | BODY MASS INDEX: 31.43 KG/M2 | SYSTOLIC BLOOD PRESSURE: 116 MMHG

## 2023-11-01 DIAGNOSIS — I10 PRIMARY HYPERTENSION: ICD-10-CM

## 2023-11-01 DIAGNOSIS — I10 PRIMARY HYPERTENSION: Primary | ICD-10-CM

## 2023-11-01 LAB
ANION GAP SERPL CALC-SCNC: 12 MMOL/L (ref 10–20)
BASOPHILS # BLD AUTO: 0.05 X10*3/UL (ref 0–0.1)
BASOPHILS NFR BLD AUTO: 1 %
BUN SERPL-MCNC: 13 MG/DL (ref 6–23)
CALCIUM SERPL-MCNC: 9.2 MG/DL (ref 8.6–10.3)
CHLORIDE SERPL-SCNC: 109 MMOL/L (ref 98–107)
CO2 SERPL-SCNC: 24 MMOL/L (ref 21–32)
CREAT SERPL-MCNC: 0.95 MG/DL (ref 0.5–1.05)
EOSINOPHIL # BLD AUTO: 0.16 X10*3/UL (ref 0–0.7)
EOSINOPHIL NFR BLD AUTO: 3 %
ERYTHROCYTE [DISTWIDTH] IN BLOOD BY AUTOMATED COUNT: 13.9 % (ref 11.5–14.5)
GFR SERPL CREATININE-BSD FRML MDRD: 70 ML/MIN/1.73M*2
GLUCOSE SERPL-MCNC: 80 MG/DL (ref 74–99)
HCT VFR BLD AUTO: 35.6 % (ref 36–46)
HGB BLD-MCNC: 11.1 G/DL (ref 12–16)
IMM GRANULOCYTES # BLD AUTO: 0.01 X10*3/UL (ref 0–0.7)
IMM GRANULOCYTES NFR BLD AUTO: 0.2 % (ref 0–0.9)
LYMPHOCYTES # BLD AUTO: 1.73 X10*3/UL (ref 1.2–4.8)
LYMPHOCYTES NFR BLD AUTO: 33 %
MAGNESIUM SERPL-MCNC: 1.98 MG/DL (ref 1.6–2.4)
MCH RBC QN AUTO: 31.9 PG (ref 26–34)
MCHC RBC AUTO-ENTMCNC: 31.2 G/DL (ref 32–36)
MCV RBC AUTO: 102 FL (ref 80–100)
MONOCYTES # BLD AUTO: 0.55 X10*3/UL (ref 0.1–1)
MONOCYTES NFR BLD AUTO: 10.5 %
NEUTROPHILS # BLD AUTO: 2.75 X10*3/UL (ref 1.2–7.7)
NEUTROPHILS NFR BLD AUTO: 52.3 %
NRBC BLD-RTO: 0 /100 WBCS (ref 0–0)
PLATELET # BLD AUTO: 177 X10*3/UL (ref 150–450)
POTASSIUM SERPL-SCNC: 4 MMOL/L (ref 3.5–5.3)
RBC # BLD AUTO: 3.48 X10*6/UL (ref 4–5.2)
SODIUM SERPL-SCNC: 141 MMOL/L (ref 136–145)
WBC # BLD AUTO: 5.3 X10*3/UL (ref 4.4–11.3)

## 2023-11-01 PROCEDURE — 3074F SYST BP LT 130 MM HG: CPT | Performed by: NURSE PRACTITIONER

## 2023-11-01 PROCEDURE — 85025 COMPLETE CBC W/AUTO DIFF WBC: CPT

## 2023-11-01 PROCEDURE — 83735 ASSAY OF MAGNESIUM: CPT

## 2023-11-01 PROCEDURE — 36415 COLL VENOUS BLD VENIPUNCTURE: CPT

## 2023-11-01 PROCEDURE — 3078F DIAST BP <80 MM HG: CPT | Performed by: NURSE PRACTITIONER

## 2023-11-01 PROCEDURE — 99213 OFFICE O/P EST LOW 20 MIN: CPT | Performed by: NURSE PRACTITIONER

## 2023-11-01 PROCEDURE — 80048 BASIC METABOLIC PNL TOTAL CA: CPT

## 2023-11-01 PROCEDURE — 1036F TOBACCO NON-USER: CPT | Performed by: NURSE PRACTITIONER

## 2023-11-01 ASSESSMENT — ENCOUNTER SYMPTOMS
HEADACHES: 1
SWEATS: 1
PND: 0
ORTHOPNEA: 0
SHORTNESS OF BREATH: 0
NECK PAIN: 0
HYPERTENSION: 1
BLURRED VISION: 0
PALPITATIONS: 1

## 2023-11-01 NOTE — PROGRESS NOTES
Subjective   Patient ID: Oly Richard is a 58 y.o. female who presents for Hypertension (Blood pressure issues /Go over lab results).    Took BP meds, RA meds, effexor, b12 and vit D in AM  - 0600  Went to school  At 830, felt a headache, flushed faced, pressure in top of head, dizzy  Thought it felt like BP was up  0930 142/94 taken by RN at school with automatic cuff  Both arms similar, 135/87  This type of episode has happened 4-5 times in last 2.5 weeks  No CP or palpitations  No near syncope  No vision change  No auditory changes  Was not changing positions at that time  No weakness, numbness, or tingling  Does have additional stressors  Marriage issues  131/84 prior to second pill        Review of Systems  ROS completely negative except what was mentioned in the HPI.  Problem List, surgical, social, and family histories which were reviewed and updated as necessary within the EMR. I also personally reviewed the notes, labs, and imaging that pertained to what was documented or discussed in the HPI.    Objective   Physical Exam  Vitals and nursing note reviewed.   Constitutional:       General: She is not in acute distress.     Appearance: Normal appearance.   HENT:      Head: Normocephalic and atraumatic.      Right Ear: External ear normal.      Left Ear: External ear normal.      Nose: Nose normal.      Mouth/Throat:      Mouth: Mucous membranes are moist.   Eyes:      Extraocular Movements: Extraocular movements intact.      Conjunctiva/sclera: Conjunctivae normal.      Pupils: Pupils are equal, round, and reactive to light.   Neck:      Vascular: No carotid bruit.   Cardiovascular:      Rate and Rhythm: Normal rate and regular rhythm.      Pulses: Normal pulses.      Heart sounds: Normal heart sounds. No murmur heard.  Pulmonary:      Effort: Pulmonary effort is normal.      Breath sounds: Normal breath sounds.   Musculoskeletal:         General: Normal range of motion.      Cervical back: Normal range  "of motion and neck supple. No tenderness.   Lymphadenopathy:      Cervical: No cervical adenopathy.   Skin:     General: Skin is warm and dry.   Neurological:      General: No focal deficit present.      Mental Status: She is alert and oriented to person, place, and time. Mental status is at baseline.      Cranial Nerves: No cranial nerve deficit.      Sensory: No sensory deficit.      Motor: No weakness.      Coordination: Coordination normal.      Gait: Gait normal.      Deep Tendon Reflexes: Reflexes normal.   Psychiatric:         Mood and Affect: Mood normal.         Behavior: Behavior normal.         Thought Content: Thought content normal.         Judgment: Judgment normal.         /75   Pulse 79   Temp 36.2 °C (97.2 °F) (Temporal)   Ht 1.537 m (5' 0.5\")   Wt 75.5 kg (166 lb 8 oz)   SpO2 98%   BMI 31.98 kg/m²     Assessment/Plan    Problem List Items Addressed This Visit       Primary hypertension - Primary    Overview     Goal <130/80  Bystolic caused hair loss (?)  Norvasc caused GI symptoms    On Benicar         Current Assessment & Plan     Monitor BP 2+ hours after morning or evening dose  To FU with readings and cuff  Diary of symptoms, what was happening prior to event         Relevant Orders    CBC and Auto Differential    Basic metabolic panel    Magnesium      "

## 2023-11-01 NOTE — ASSESSMENT & PLAN NOTE
Monitor BP 2+ hours after morning or evening dose  To FU with readings and cuff  Diary of symptoms, what was happening prior to event

## 2023-11-01 NOTE — PATIENT INSTRUCTIONS
Monitor BP 2+ hours after morning or evening dose  To FU with readings and cuff  Diary of symptoms, what was happening prior to event    Labs

## 2023-11-15 ENCOUNTER — APPOINTMENT (OUTPATIENT)
Dept: PRIMARY CARE | Facility: CLINIC | Age: 58
End: 2023-11-15
Payer: COMMERCIAL

## 2023-11-29 ENCOUNTER — CLINICAL SUPPORT (OUTPATIENT)
Dept: LAB | Facility: CLINIC | Age: 58
End: 2023-11-29
Payer: COMMERCIAL

## 2023-11-29 ENCOUNTER — OFFICE VISIT (OUTPATIENT)
Dept: HEMATOLOGY/ONCOLOGY | Facility: CLINIC | Age: 58
End: 2023-11-29
Payer: COMMERCIAL

## 2023-11-29 DIAGNOSIS — E61.1 IRON DEFICIENCY: ICD-10-CM

## 2023-11-29 DIAGNOSIS — D64.9 ANEMIA, UNSPECIFIED TYPE: ICD-10-CM

## 2023-11-29 DIAGNOSIS — T45.4X5D ADVERSE EFFECT OF IRON, SUBSEQUENT ENCOUNTER: ICD-10-CM

## 2023-11-29 DIAGNOSIS — D64.9 ANEMIA, UNSPECIFIED TYPE: Primary | ICD-10-CM

## 2023-11-29 LAB
ALBUMIN SERPL BCP-MCNC: 4.5 G/DL (ref 3.4–5)
ALP SERPL-CCNC: 56 U/L (ref 33–110)
ALT SERPL W P-5'-P-CCNC: 20 U/L (ref 7–45)
ANION GAP SERPL CALC-SCNC: 11 MMOL/L (ref 10–20)
AST SERPL W P-5'-P-CCNC: 22 U/L (ref 9–39)
BASOPHILS # BLD AUTO: 0.04 X10*3/UL (ref 0–0.1)
BASOPHILS NFR BLD AUTO: 0.6 %
BILIRUB SERPL-MCNC: 0.3 MG/DL (ref 0–1.2)
BUN SERPL-MCNC: 17 MG/DL (ref 6–23)
CALCIUM SERPL-MCNC: 9.4 MG/DL (ref 8.6–10.3)
CHLORIDE SERPL-SCNC: 108 MMOL/L (ref 98–107)
CO2 SERPL-SCNC: 27 MMOL/L (ref 21–32)
CREAT SERPL-MCNC: 1.07 MG/DL (ref 0.5–1.05)
EOSINOPHIL # BLD AUTO: 0.12 X10*3/UL (ref 0–0.7)
EOSINOPHIL NFR BLD AUTO: 1.9 %
ERYTHROCYTE [DISTWIDTH] IN BLOOD BY AUTOMATED COUNT: 14.1 % (ref 11.5–14.5)
FERRITIN SERPL-MCNC: 227 NG/ML (ref 8–150)
GFR SERPL CREATININE-BSD FRML MDRD: 60 ML/MIN/1.73M*2
GLUCOSE SERPL-MCNC: 97 MG/DL (ref 74–99)
HCT VFR BLD AUTO: 34.8 % (ref 36–46)
HGB BLD-MCNC: 10.9 G/DL (ref 12–16)
IMM GRANULOCYTES # BLD AUTO: 0.01 X10*3/UL (ref 0–0.7)
IMM GRANULOCYTES NFR BLD AUTO: 0.2 % (ref 0–0.9)
IRON SATN MFR SERPL: 21 % (ref 25–45)
IRON SERPL-MCNC: 63 UG/DL (ref 35–150)
LYMPHOCYTES # BLD AUTO: 1.9 X10*3/UL (ref 1.2–4.8)
LYMPHOCYTES NFR BLD AUTO: 30.5 %
MCH RBC QN AUTO: 32.2 PG (ref 26–34)
MCHC RBC AUTO-ENTMCNC: 31.3 G/DL (ref 32–36)
MCV RBC AUTO: 103 FL (ref 80–100)
MONOCYTES # BLD AUTO: 0.55 X10*3/UL (ref 0.1–1)
MONOCYTES NFR BLD AUTO: 8.8 %
NEUTROPHILS # BLD AUTO: 3.61 X10*3/UL (ref 1.2–7.7)
NEUTROPHILS NFR BLD AUTO: 58 %
PLATELET # BLD AUTO: 203 X10*3/UL (ref 150–450)
POTASSIUM SERPL-SCNC: 4.3 MMOL/L (ref 3.5–5.3)
PROT SERPL-MCNC: 6.8 G/DL (ref 6.4–8.2)
RBC # BLD AUTO: 3.39 X10*6/UL (ref 4–5.2)
SODIUM SERPL-SCNC: 142 MMOL/L (ref 136–145)
TIBC SERPL-MCNC: 301 UG/DL (ref 240–445)
UIBC SERPL-MCNC: 238 UG/DL (ref 110–370)
WBC # BLD AUTO: 6.2 X10*3/UL (ref 4.4–11.3)

## 2023-11-29 PROCEDURE — 83540 ASSAY OF IRON: CPT

## 2023-11-29 PROCEDURE — 85025 COMPLETE CBC W/AUTO DIFF WBC: CPT

## 2023-11-29 PROCEDURE — 82728 ASSAY OF FERRITIN: CPT

## 2023-11-29 PROCEDURE — 36415 COLL VENOUS BLD VENIPUNCTURE: CPT

## 2023-11-29 PROCEDURE — 80053 COMPREHEN METABOLIC PANEL: CPT

## 2023-11-29 NOTE — PROGRESS NOTES
Patient did not have labs drawn prior to today's visit as planned. Therefore, patient considered a no show. This note created for purposes of placing rescheduling orders and request to remind patient to have labs drawn prior to visit.

## 2023-12-06 ENCOUNTER — OFFICE VISIT (OUTPATIENT)
Dept: HEMATOLOGY/ONCOLOGY | Facility: CLINIC | Age: 58
End: 2023-12-06
Payer: COMMERCIAL

## 2023-12-06 DIAGNOSIS — D64.9 ANEMIA, UNSPECIFIED TYPE: Primary | ICD-10-CM

## 2023-12-06 PROCEDURE — 99213 OFFICE O/P EST LOW 20 MIN: CPT | Performed by: PHYSICIAN ASSISTANT

## 2023-12-06 PROCEDURE — 1036F TOBACCO NON-USER: CPT | Performed by: PHYSICIAN ASSISTANT

## 2023-12-06 NOTE — PROGRESS NOTES
"Patient ID: Oly Richard is a 58 y.o. female.  Referring Physician: No referring provider defined for this encounter.  Primary Care Provider: Susie Malave MD  Visit Type: Follow Up    Location: [facility]  Diagnosis/Reason: Multifactorial Anemia (2/2 NABEEL and Underlying CKD)    Interval Hx  12/6/23  Oly Richard is a 58 y.o. female following up today for multifactorial anemia 2/2 NABEEL and underlying CKD    RECALL:  Patient has a medical hx significant for anemia, hx of NABEEL, hx B12 deficiency, hx B/L nephrolithiasis, RA, psoriasis  9/8/23 - Patient reports a history of trialing oral iron supplementation (as noted below) but experienced intolerable abdominal and \"profuse projectile vomiting\"  12/6/23 - Patient received cycle #1 (planned 2 of 3 doses) of IV iron sucrose on 10/19/23 and 10/24/23 without adverse effect or reaction reported    Patient denies weight loss, abnormal bruising and bleeding, hematuria, blood in stool, dark/black stools, epistaxis, oral/gingival bleeding, lymphadenopathy, recurrent infections, recurrent fevers, night sweats, early satiety, abdominal pain, bone pain, chest pain, palpitations, SOB, CURRY, fatigue, dizziness, lightheadedness, PICA.      Relevant Hx  8/29/23 - Initial Visit  Oly is a 57 y/o female referred for consultation of anemia    Per review of records:  9/6/23 lab results obtained via [facility]:  WBC count & differential WNL  Macrocytic (.7), Normochromic anemia w/ Hb at 11.5 - RBC count low at 3.63, Hct WNL, RDW WNL  Platelets WNL at 187K  Reticulocytes: % WNL at 1.7% and # WNL at 0.060  LANEY: Negative  LDH: Mildly elevated at 240  Haptoglobin: WNL at 97    Relevant medications:  -Topamax 200mg tab + 50mg tab (total dose 250mg) BID  -Rinvoq 15mg 1 tab PO QD  -Vitamin B12 1000mcg QD  -Lacosamide 100mg BID  -Lefulunomide 20mg  -Currently using NSAID's (Naproxen, Ibuprofen etc.): Motrin & Tylenol PRN - States seldom use  -Currently taking any supplements:    Previous " "Hematological Background:  -Hx of hematological disorders: Denies  -Hx of blood transfusions: Denies  -Hx of iron supplementation: Hx of iron infusions (unsure of type) denies adverse effect or reaction, says she had 6 infusions in the past - Has previously tried oral iron (unsure of type) in the past but reports it causes her intolerable abdominal pain and \"profuse projectile vomiting\"  -Hx of folate supplementation: Via prenatal vitamin - Denies adverse effect or reaction  -Hx of Vitamin B12 supplementation:  Via prenatal vitamin - Denies adverse effect or reaction- & currently on vitamin B12 w/o adverse effect or reaction    Today she c/o fatigue but attributes it to her hx of RA and 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    Patient denies weight loss, abnormal bruising and bleeding, hematuria, blood in stool, dark/black stools, epistaxis, oral/gingival bleeding, lymphadenopathy, recurrent infections, recurrent fevers, night sweats, early satiety, abdominal pain, bone pain, chest pain, palpitations, SOB, CURRY, PICA.    PMHx:  Active Ambulatory Problems     Diagnosis Date Noted    Adhesive capsulitis of right shoulder 03/15/2016    Mixed anxiety depressive disorder 05/17/2023    Calculus of kidney 05/17/2023    Epilepsy (CMS/[facility]) 05/17/2023    H/O iron deficiency anemia 05/17/2023    Primary hypertension 05/17/2023    Insulin resistance 05/17/2023    Low vitamin B12 level 05/17/2023    Migraine without aura and without status migrainosus, not intractable 06/22/2004    Psoriasis 05/17/2023    Rheumatoid arthritis (CMS/[facility]) 08/02/2022    Temporal lobe epilepsy (CMS/[facility]) 08/26/2009    Vitamin D deficiency 05/17/2023    Anemia 08/11/2023    IFG (impaired fasting glucose) 08/11/2023    Routine adult health maintenance 08/12/2023    Body mass index 30.0-30.9, adult 08/12/2023    Migraine headache 08/12/2023    Menopausal syndrome 08/12/2023 "    Chronic insomnia 2023    Acquired hypothyroidism 2023    Iron deficiency 2023    Adverse effect of iron 2023    Bronchitis 10/11/2023    Disease due to severe acute respiratory syndrome coronavirus 2 (SARS-CoV-2) 10/11/2023    Severe obesity (CMS/HCC) 10/11/2023    Symptoms involving urinary system 10/11/2023    Weight gain 10/11/2023    Bilateral nephrolithiasis 10/11/2023    Hypertension, essential 10/11/2023     Resolved Ambulatory Problems     Diagnosis Date Noted    Iron deficiency anemia 2023    Limited joint range of motion 2016    Rotator cuff tendonitis 2016    Exposure to strep throat 2023    URI, acute 2023     Past Medical History:   Diagnosis Date    Anxiety     Asthma     Depression     H/O chest x-ray     Hypertension       Anemia  Hx NABEEL  Hx B12 deficiency  Psoriasis  RA  Obesity  Hx B/L nephrolithiasis  Anxiety and depression  HTN  Insulin resistance  Migraine  Vitamin D Deficiency    PSHx:  Past Surgical History:   Procedure Laterality Date    COLONOSCOPY  2019    2012: Impression:     - Tortuous colon.           - The examination was otherwise normal on direct and           retroflexion views.    HYSTERECTOMY  2019    left ovary remains    OTHER SURGICAL HISTORY  2019     section    OTHER SURGICAL HISTORY  2019    Knee arthrotomy      Hysterectomy - Left ovary remains  Knee arthrotomy/Patellofemoral syndrome repair      FHx:  Family History   Problem Relation Name Age of Onset    No Known Problems Mother          Father: CAD/CABG, dx age 60s Mother:  HLD, OP, hypothyroidism Brother: epilepsy  Sister: passed away  from epileptic seizure  Mat gmother:  at 46 from aneurysm, HTN Mat gfather:  at 78 from CHF, throat Ca  Pat gmother:  at 62      Patient denies family history of hematologic, autoimmune disorders  Maternal grandfather: Smoker, Esophageal cancer  Children: 3 sons - All w/  patellofemoral syndrome  Miscarriages: 1    Social Hx:  Oly Richard    reports that she has quit smoking. Her smoking use included cigarettes. She has never used smokeless tobacco.  She  reports current alcohol use.  She  reports no history of drug use.  Social History     Socioeconomic History    Marital status:      Spouse name: Not on file    Number of children: 3    Years of education: Not on file    Highest education level: Not on file   Occupational History    Not on file   Tobacco Use    Smoking status: Former     Types: Cigarettes    Smokeless tobacco: Never   Vaping Use    Vaping Use: Never used   Substance and Sexual Activity    Alcohol use: Yes     Comment: Ocassionally    Drug use: Never    Sexual activity: Defer   Other Topics Concern    Not on file   Social History Narrative    , 3 sons    Works at [employer]    Former Smoker: Quit ![id number]    Smoked 14yrs, 1/2 ppd    Social ETOH    ---    Family History:    Father: CAD/CABG, dx age 60s    Mother:  HLD, OP, hypothyroidism    Brother: epilepsy     Sister: passed away  from epileptic seizure     Mat gmother:  at 46 from aneurysm, HTN    Mat gfather:  at 78 from CHF, throat Ca     Pat gmother:  at 62 of kidney failure    Pat gfather:  at 82 of stroke     Social Determinants of Health     Financial Resource Strain: Not on file   Food Insecurity: Not on file   Transportation Needs: Not on file   Physical Activity: Not on file   Stress: Not on file   Social Connections: Not on file   Intimate Partner Violence: Not on file   Housing Stability: Not on file      Living Situation: Lives at home w/   Occupation:   Marital Status:   Alcohol Use: Occasional  Smoking: Former smoker, quit  - Smoked 1/2 ppd x 14 years  Recreational Drug Use: Denies  Special diets: Denies  Ethnicity: /English    Cancer Screenings:  Upper EGD: Never done before - Scheduled to have first EGD in  "coming months  Colonoscopy: Previously done approximately 10 years ago - Scheduled to have in coming months as ordered by PCP  Mammogram: Approximately 2019  PAP smear: Has not had one since her hysterectomy  Lung cancer screenings: Denies    Medications and allergies reviewed in EMR.    ROS:  Review of Systems - Oncology   10 point review of systems negative except as state in HPI.    Vitals & Statistics:  Objective   BSA: There is no height or weight on file to calculate BSA.  There were no vitals taken for this visit.    Physical Exam:  Physical Exam      Results:  Lab Results   Component Value Date    WBC 6.2 11/29/2023    NEUTROABS 3.61 11/29/2023    IGABSOL 0.01 11/29/2023    LYMPHSABS 1.90 11/29/2023    MONOSABS 0.55 11/29/2023    EOSABS 0.12 11/29/2023    BASOSABS 0.04 11/29/2023    RBC 3.39 (L) 11/29/2023     (H) 11/29/2023    MCHC 31.3 (L) 11/29/2023    HGB 10.9 (L) 11/29/2023    HCT 34.8 (L) 11/29/2023     11/29/2023     No results found for: \"RETICCTPCT\"   Lab Results   Component Value Date    CREATININE 1.07 (H) 11/29/2023    BUN 17 11/29/2023    EGFR 60 (L) 11/29/2023     11/29/2023    K 4.3 11/29/2023     (H) 11/29/2023    CO2 27 11/29/2023      Lab Results   Component Value Date    ALT 20 11/29/2023    AST 22 11/29/2023    ALKPHOS 56 11/29/2023    BILITOT 0.3 11/29/2023      Lab Results   Component Value Date    TSH 0.78 09/08/2023     Lab Results   Component Value Date    TSH 0.78 09/08/2023    THYROIDPAB <10 08/16/2019     Lab Results   Component Value Date    IRON 63 11/29/2023    TIBC 301 11/29/2023    FERRITIN 227 (H) 11/29/2023      Lab Results   Component Value Date    ODTVKLPE34 1,550 (H) 09/08/2023      Lab Results   Component Value Date    FOLATE 12.7 09/08/2023     Lab Results   Component Value Date    ALESSANDRA NEGATIVE 09/08/2023    SEDRATE 8 09/08/2023      Lab Results   Component Value Date    CRP 0.46 09/08/2023      No results found for: \"LANEY\"  No results found " "for: \"LDH\"  No results found for: \"HAPTOGLOBIN\"  Lab Results   Component Value Date    SPEP NORMAL 09/08/2023     Lab Results   Component Value Date     09/08/2023    IGM 42 09/08/2023     09/08/2023     Lab Results   Component Value Date    HEPBSAG NONREACTIVE 04/30/2021     No results found for: \"HIV1X2\"    Assessment:  Oly Richard is a 58 y.o. female following up today for multifactorial anemia 2/2 NABEEL and underlying CKD    I reviewed patient's chart including but not limited to labs, imaging, surgical/procedure notes, pathology, hospital notes, doctor's notes.    11/29/23 results:  WBC count & differential WNL  Macrocytic and hypochromic anemia w/ Hb at 10.9 - RBC count low, Hct low, RDW WNL  Platelets WNL  Renal: BUN WNL - Creatinine elevated at 1.07 - GFR low at 60 - Underlying CKD  Iron studies: Ferritin elevated at 227 - Iron, TIBC and %Sat all WNL for this practice - 2/2 recent iron infusions    Plan:  Based on H&P and hematologic workup:  9/29/23 - Oly's anemia is most likely d/t iron deficiency. Her macrocytosis is most likely d/t lefluonomide as it has been shown to cause macrocytosis. There is no evidence of hepatic disease, ALESSANDRA negative, No paraprotein, no evidence of hemolysis or other nutrient deficiency. She also has evidence of renal disease which may be playing a part in her anemia.  Topamax - Shown to cause elevated GGT,  Nephrolithiasis, Leukopenia, Neutropenia, Eosinophilia, Thrombocytosis  Rinvoq - Shown to cause anemia, leukopenia, lymphopenia, neutropenia, elevated LFT's, elevated bilirubin, malignant lymphoma  Lacosamide - Shown to cause bruising, Abnormal LFT's, Anemia, Agranulocytosis, Neutropenia,   Leflunomide - Macrocytosis, Abnormal LFT's, Leukocytosis, Leukopenia, Pancytopenia, Neutropenia, Thrombocytopenia        Anemia - Multifactorial Anemia - NABEEL & Underlying CKD  RTC in 3 months via in-person visit - F/U sooner if needed/urgent  Labs: CBC-D, CMP, B12, " Folate, iron studies up to 1 week prior    I had an extensive discussion with the patient regarding the diagnosis and discussed the plan of therapy, including general considerations regarding side effects and outcomes. Pt understood and gave appropriate teach back about the plan of care. All questions were answered to the patient's satisfaction. The patient is instructed to contact us at any time if questions or problems arise. Thank you for the opportunity to participate in the care of this very pleasant patient.    Total time = 30 minutes. 50% or more of this time was spent in counseling and/or coordination of care including reviewing medical history/radiology/labs, examining patient, formulating outlined plan with team, and discussing plan with patient/family.      Erasto Nayak PA-C

## 2024-01-13 DIAGNOSIS — E66.01 MORBID (SEVERE) OBESITY DUE TO EXCESS CALORIES (MULTI): ICD-10-CM

## 2024-01-14 RX ORDER — DULAGLUTIDE 0.75 MG/.5ML
INJECTION, SOLUTION SUBCUTANEOUS
Qty: 6 ML | Refills: 3 | Status: SHIPPED | OUTPATIENT
Start: 2024-01-14

## 2024-03-13 ENCOUNTER — APPOINTMENT (OUTPATIENT)
Dept: HEMATOLOGY/ONCOLOGY | Facility: CLINIC | Age: 59
End: 2024-03-13
Payer: COMMERCIAL

## 2024-03-14 ENCOUNTER — TELEMEDICINE (OUTPATIENT)
Dept: PRIMARY CARE | Facility: CLINIC | Age: 59
End: 2024-03-14
Payer: COMMERCIAL

## 2024-03-14 DIAGNOSIS — M06.9 RHEUMATOID ARTHRITIS, INVOLVING UNSPECIFIED SITE, UNSPECIFIED WHETHER RHEUMATOID FACTOR PRESENT (MULTI): ICD-10-CM

## 2024-03-14 DIAGNOSIS — N20.0 CALCULUS OF KIDNEY: Primary | ICD-10-CM

## 2024-03-14 DIAGNOSIS — G40.109 TEMPORAL LOBE EPILEPSY (MULTI): ICD-10-CM

## 2024-03-14 PROCEDURE — 99213 OFFICE O/P EST LOW 20 MIN: CPT | Performed by: NURSE PRACTITIONER

## 2024-03-14 PROCEDURE — 1036F TOBACCO NON-USER: CPT | Performed by: NURSE PRACTITIONER

## 2024-03-14 PROCEDURE — 3008F BODY MASS INDEX DOCD: CPT | Performed by: NURSE PRACTITIONER

## 2024-03-14 RX ORDER — PHENAZOPYRIDINE HYDROCHLORIDE 100 MG/1
TABLET, FILM COATED ORAL
Qty: 12 TABLET | Refills: 0 | Status: SHIPPED | OUTPATIENT
Start: 2024-03-14 | End: 2024-03-26 | Stop reason: SDUPTHER

## 2024-03-14 RX ORDER — PREDNISONE 5 MG/1
TABLET ORAL
COMMUNITY
Start: 2023-12-27

## 2024-03-14 RX ORDER — TRAMADOL HYDROCHLORIDE 50 MG/1
50 TABLET ORAL EVERY 6 HOURS PRN
Qty: 28 TABLET | Refills: 0 | Status: SHIPPED | OUTPATIENT
Start: 2024-03-14 | End: 2024-03-21

## 2024-03-14 ASSESSMENT — ENCOUNTER SYMPTOMS: PAIN: 1

## 2024-03-14 NOTE — ASSESSMENT & PLAN NOTE
Can try pyridium   Also sent tramadol prn  - R/B discussed   ED red flags and symptomatic care discussed

## 2024-03-14 NOTE — PROGRESS NOTES
An interactive audio/visual telecommunication system which permits real time communications between the patient (at the originating site) and provider (at the distant site) was utilized to provide this telehealth service.    Verbal consent was requested and obtained from the patient for this telehealth visit.  We have confirmed the patient wishes to see Amirah matias, a board certified family nurse practitioner with an active [facility] license as well as verified the patient's identity and physical location in [place].

## 2024-03-14 NOTE — PROGRESS NOTES
An interactive audio/visual telecommunication system which permits real time communications between the patient (at the originating site) and provider (at the distant site) was utilized to provide this telehealth service.    Verbal consent was requested and obtained from the patient for this telehealth visit.  We have confirmed the patient wishes to see me, Amirah Bolivar, a board certified family nurse practitioner with an active [facility] license as well as verified the patient's identity and physical location in [place].    Problem List Items Addressed This Visit       Calculus of kidney - Primary    Overview     Nonobstructive bilateral renal calculi  Is on Topamax for seizures;         Current Assessment & Plan     Can try pyridium   Also sent tramadol prn  - R/B discussed   ED red flags and symptomatic care discussed          Relevant Medications    phenazopyridine (Pyridium) 100 mg tablet    traMADol (Ultram) 50 mg tablet    Rheumatoid arthritis (CMS/[facility])    Overview     On Arava (used to take Enbrel, remission) and  Started on Rinvoq 5/21  Managed by Rheum/Dr Ba [facility]  12/23 Dr Ba: Discussed the current status of her rheumatoid arthritis as mild to modestly active. I recommend she maintain the Rinvoq 15 mg daily and the leflunomide 20 mg daily. She will reach out to me through [employer] if she needs refills. I reviewed recent labs showing the anemia and her hematologist is continue to monitor lab and has upcoming labs in the next 2 weeks. I will await those results. We discussed intermittent flareups and the use of prednisone is acceptable as she does this on a sparing basis. If she needs refills on this she will let me know.          Temporal lobe epilepsy (CMS/HCC)    Overview     Left frontotemporal Typical partial seizures but has had 4-5 Generalized tonic clonic seizures  On Vimpat and Topamax  Managed by Neurology (Dr Gonsalez [facility])             Subjective   Patient ID: Oly Richard is a 58 y.o. female  "who presents for Pain (Kidney stone/6 days /Having difficulties to pass - persistent pain ).  Pain      Kidney stones  Recurrent  She is urinating  No hematuria  No fever  No n/v  Staying well hydrated  Feeling spasms  - not when she is urinating  Bilat low back pain L > R  Tylenol & motrin wo relief      Review of Systems   All other systems reviewed and are negative.      BP Readings from Last 3 Encounters:   11/01/23 116/75   10/24/23 130/90   10/19/23 142/85      Wt Readings from Last 3 Encounters:   11/01/23 75.5 kg (166 lb 8 oz)   10/24/23 76.5 kg (168 lb 10.4 oz)   10/19/23 76.6 kg (168 lb 14 oz)      BMI:   Estimated body mass index is 31.98 kg/m² as calculated from the following:    Height as of 11/1/23: 1.537 m (5' 0.5\").    Weight as of 11/1/23: 75.5 kg (166 lb 8 oz).    Objective   Physical Exam  Gen: Alert, NAD  Respiratory:  resp effort NL  Neuro:  coordination intact   Mood: normal    Sitting upright   "

## 2024-03-26 ENCOUNTER — PATIENT MESSAGE (OUTPATIENT)
Dept: PRIMARY CARE | Facility: CLINIC | Age: 59
End: 2024-03-26
Payer: COMMERCIAL

## 2024-03-26 DIAGNOSIS — N20.0 CALCULUS OF KIDNEY: ICD-10-CM

## 2024-03-26 RX ORDER — PHENAZOPYRIDINE HYDROCHLORIDE 100 MG/1
TABLET, FILM COATED ORAL
Qty: 12 TABLET | Refills: 0 | Status: SHIPPED | OUTPATIENT
Start: 2024-03-26

## 2024-03-26 RX ORDER — TAMSULOSIN HYDROCHLORIDE 0.4 MG/1
0.4 CAPSULE ORAL DAILY
Qty: 30 CAPSULE | Refills: 0 | Status: SHIPPED | OUTPATIENT
Start: 2024-03-26 | End: 2024-04-18

## 2024-03-26 NOTE — PATIENT COMMUNICATION
Spoke with patient  States she hasn't ever tried flomax, willing to try   Formatted refill for pyridium   States she has left over tramadol

## 2024-04-18 DIAGNOSIS — N20.0 CALCULUS OF KIDNEY: ICD-10-CM

## 2024-04-18 RX ORDER — TAMSULOSIN HYDROCHLORIDE 0.4 MG/1
0.4 CAPSULE ORAL DAILY
Qty: 90 CAPSULE | Refills: 3 | Status: SHIPPED | OUTPATIENT
Start: 2024-04-18

## 2024-05-22 DIAGNOSIS — Z12.31 ENCOUNTER FOR SCREENING MAMMOGRAM FOR BREAST CANCER: ICD-10-CM

## 2024-06-28 DIAGNOSIS — F32.A DEPRESSION, UNSPECIFIED: ICD-10-CM

## 2024-06-28 DIAGNOSIS — F41.9 ANXIETY DISORDER, UNSPECIFIED: ICD-10-CM

## 2024-06-28 RX ORDER — VENLAFAXINE HYDROCHLORIDE 75 MG/1
75 CAPSULE, EXTENDED RELEASE ORAL DAILY
Qty: 90 CAPSULE | Refills: 4 | Status: SHIPPED | OUTPATIENT
Start: 2024-06-28

## 2024-07-09 ENCOUNTER — PATIENT MESSAGE (OUTPATIENT)
Dept: PRIMARY CARE | Facility: CLINIC | Age: 59
End: 2024-07-09
Payer: COMMERCIAL

## 2024-07-09 NOTE — PATIENT COMMUNICATION
Spoke with patient.  Relayed message.  Patient verbally understood.  Patient will take 2 capsules (75 mg each) daily and is scheduled with SF for a VV on 7/30/2024.

## 2024-07-30 ENCOUNTER — APPOINTMENT (OUTPATIENT)
Dept: PRIMARY CARE | Facility: CLINIC | Age: 59
End: 2024-07-30
Payer: COMMERCIAL

## 2024-07-30 VITALS
HEIGHT: 62 IN | SYSTOLIC BLOOD PRESSURE: 117 MMHG | WEIGHT: 164 LBS | DIASTOLIC BLOOD PRESSURE: 74 MMHG | HEART RATE: 84 BPM | BODY MASS INDEX: 30.18 KG/M2

## 2024-07-30 DIAGNOSIS — R73.01 IFG (IMPAIRED FASTING GLUCOSE): ICD-10-CM

## 2024-07-30 DIAGNOSIS — D50.9 IRON DEFICIENCY ANEMIA, UNSPECIFIED IRON DEFICIENCY ANEMIA TYPE: ICD-10-CM

## 2024-07-30 DIAGNOSIS — E03.9 ACQUIRED HYPOTHYROIDISM: ICD-10-CM

## 2024-07-30 DIAGNOSIS — F41.8 MIXED ANXIETY DEPRESSIVE DISORDER: Primary | ICD-10-CM

## 2024-07-30 DIAGNOSIS — R79.89 LOW VITAMIN B12 LEVEL: ICD-10-CM

## 2024-07-30 DIAGNOSIS — Z00.00 ROUTINE ADULT HEALTH MAINTENANCE: ICD-10-CM

## 2024-07-30 DIAGNOSIS — E55.9 VITAMIN D DEFICIENCY: ICD-10-CM

## 2024-07-30 DIAGNOSIS — G40.109 TEMPORAL LOBE EPILEPSY (MULTI): ICD-10-CM

## 2024-07-30 PROBLEM — U07.1 DISEASE DUE TO SEVERE ACUTE RESPIRATORY SYNDROME CORONAVIRUS 2 (SARS-COV-2): Status: RESOLVED | Noted: 2023-10-11 | Resolved: 2024-07-30

## 2024-07-30 PROBLEM — E66.01 SEVERE OBESITY (MULTI): Status: RESOLVED | Noted: 2023-10-11 | Resolved: 2024-07-30

## 2024-07-30 PROCEDURE — 99214 OFFICE O/P EST MOD 30 MIN: CPT | Performed by: NURSE PRACTITIONER

## 2024-07-30 PROCEDURE — 1036F TOBACCO NON-USER: CPT | Performed by: NURSE PRACTITIONER

## 2024-07-30 PROCEDURE — 3008F BODY MASS INDEX DOCD: CPT | Performed by: NURSE PRACTITIONER

## 2024-07-30 PROCEDURE — 3078F DIAST BP <80 MM HG: CPT | Performed by: NURSE PRACTITIONER

## 2024-07-30 PROCEDURE — 3074F SYST BP LT 130 MM HG: CPT | Performed by: NURSE PRACTITIONER

## 2024-07-30 RX ORDER — VENLAFAXINE HYDROCHLORIDE 150 MG/1
150 CAPSULE, EXTENDED RELEASE ORAL DAILY
Qty: 90 CAPSULE | Refills: 3 | Status: SHIPPED | OUTPATIENT
Start: 2024-07-30 | End: 2025-07-30

## 2024-07-30 ASSESSMENT — PATIENT HEALTH QUESTIONNAIRE - PHQ9
SUM OF ALL RESPONSES TO PHQ9 QUESTIONS 1 AND 2: 0
1. LITTLE INTEREST OR PLEASURE IN DOING THINGS: NOT AT ALL
2. FEELING DOWN, DEPRESSED OR HOPELESS: NOT AT ALL

## 2024-07-30 NOTE — ASSESSMENT & PLAN NOTE
Effexor at 150mg has helped depression, but anxiety minimally  Managing with deep breathing and meditation exercises  Declined alternate therapy or additional therapy  Feels she is coping well, monitor

## 2024-07-30 NOTE — ASSESSMENT & PLAN NOTE
Reminded patient of mammogram order to be scheduled  Routine fasting labs ordered  To schedule CPE with PCP

## 2024-07-30 NOTE — PROGRESS NOTES
An interactive audio and video telecommunication system which permits real time communications between the patient (at the originating site) and provider (at the distant site) was utilized to provide this telehealth service.  Verbal consent was requested and obtained from the patient for this telehealth visit.     Subjective   Patient ID: Oly Richard is a 59 y.o. female who presents for Anxiety and Depression.    Increased cymbalta  Feeling much better  Helping more with the depression over the anxiety  Still feeling a little anxious  Grinding teeth at night  - improved minimally  Doing somatic exercises  - breathing and meditation  Does not want to take any more medicine          Review of Systems  ROS completely negative except what was mentioned in the HPI.  Problem List, surgical, social, and family histories which were reviewed and updated as necessary within the EMR. I also personally reviewed the notes, labs, and imaging that pertained to what was documented or discussed in the HPI.    Objective   Physical Exam  Vitals and nursing note reviewed.   Constitutional:       Appearance: Normal appearance.   HENT:      Head: Normocephalic and atraumatic.      Right Ear: External ear normal.      Left Ear: External ear normal.      Nose: Nose normal.      Mouth/Throat:      Mouth: Mucous membranes are moist.   Eyes:      Extraocular Movements: Extraocular movements intact.      Conjunctiva/sclera: Conjunctivae normal.   Pulmonary:      Effort: Pulmonary effort is normal.   Musculoskeletal:      Cervical back: Normal range of motion and neck supple.   Neurological:      General: No focal deficit present.      Mental Status: She is alert and oriented to person, place, and time. Mental status is at baseline.   Psychiatric:         Mood and Affect: Mood normal.         Behavior: Behavior normal.         Thought Content: Thought content normal.         Judgment: Judgment normal.         /74   Pulse 84   Ht  "1.575 m (5' 2\")   Wt 74.4 kg (164 lb)   BMI 30.00 kg/m²     Assessment/Plan    Problem List Items Addressed This Visit       Acquired hypothyroidism    Current Assessment & Plan     Check TSH         Relevant Orders    TSH with reflex to Free T4 if abnormal    Anemia    Relevant Orders    CBC and Auto Differential    Urinalysis with Reflex Culture and Microscopic    Iron and TIBC    Ferritin    Body mass index 30.0-30.9, adult    Overview     On Trulicity ( higher dose  caused GI side effects)         Relevant Orders    Referral to Clinical Pharmacy    CBC and Auto Differential    Comprehensive Metabolic Panel    Vitamin D 25-Hydroxy,Total (for eval of Vitamin D levels)    Lipid Panel    Urinalysis with Reflex Culture and Microscopic    Vitamin B12    Hemoglobin A1C    Iron and TIBC    Ferritin    TSH with reflex to Free T4 if abnormal    IFG (impaired fasting glucose)    Overview     8/23:   On Trulicity (higher doses caused AE)         Relevant Orders    Referral to Clinical Pharmacy    Comprehensive Metabolic Panel    Lipid Panel    Urinalysis with Reflex Culture and Microscopic    Hemoglobin A1C    TSH with reflex to Free T4 if abnormal    Low vitamin B12 level    Overview     2021: 271  On 2000 mcginternational units level was too high  Decreased to 1000mcg I 8/23             Relevant Orders    Vitamin B12    Mixed anxiety depressive disorder - Primary    Overview     Would avoid Wellbutrin due to hx seizures  Started Effexor 9/22;         Current Assessment & Plan     Effexor at 150mg has helped depression, but anxiety minimally  Managing with deep breathing and meditation exercises  Declined alternate therapy or additional therapy  Feels she is coping well, monitor         Relevant Medications    venlafaxine XR (Effexor-XR) 150 mg 24 hr capsule    Other Relevant Orders    Referral to Clinical Pharmacy    Routine adult health maintenance    Overview     Declined COVID 19 vaccine, shingrix, influenza " vaccines  DT(PEDIATRIC)1/1/2000   Hepatitis B Adult1/1/2000   PPD ([place])1/1/2000   Tdap (Age 7+) 6/23/2015, 10/26/20   Cscope 2012 (10yrs), 10/12/23 (5y)  s/p CARLOS, left ovary remains  Mamm 8/14/19  Hep C (-) 6/23/15  PNeumovax 2017[facility]         Current Assessment & Plan     Reminded patient of mammogram order to be scheduled  Routine fasting labs ordered  To schedule CPE with PCP         Relevant Orders    Referral to Clinical Pharmacy    CBC and Auto Differential    Comprehensive Metabolic Panel    Vitamin D 25-Hydroxy,Total (for eval of Vitamin D levels)    Lipid Panel    Urinalysis with Reflex Culture and Microscopic    Vitamin B12    Hemoglobin A1C    Iron and TIBC    Ferritin    TSH with reflex to Free T4 if abnormal    Temporal lobe epilepsy (Multi)    Overview     Left frontotemporal Typical partial seizures but has had 4-5 Generalized tonic clonic seizures  On Vimpat and Topamax  Managed by Neurology (Dr Gonsalez [facility])         Relevant Orders    Referral to Clinical Pharmacy    CBC and Auto Differential    Comprehensive Metabolic Panel    Vitamin D 25-Hydroxy,Total (for eval of Vitamin D levels)    Lipid Panel    Urinalysis with Reflex Culture and Microscopic    Vitamin B12    Hemoglobin A1C    Iron and TIBC    Ferritin    TSH with reflex to Free T4 if abnormal    Vitamin D deficiency    Overview     Comment on above: Goal 30-40(hx kidney stones)Not supplemented;         Relevant Orders    Vitamin D 25-Hydroxy,Total (for eval of Vitamin D levels)

## 2024-09-09 ENCOUNTER — APPOINTMENT (OUTPATIENT)
Dept: PHARMACY | Facility: HOSPITAL | Age: 59
End: 2024-09-09
Payer: COMMERCIAL

## 2024-09-09 DIAGNOSIS — Z00.00 ROUTINE ADULT HEALTH MAINTENANCE: ICD-10-CM

## 2024-09-09 DIAGNOSIS — I10 PRIMARY HYPERTENSION: Primary | ICD-10-CM

## 2024-09-09 DIAGNOSIS — R73.01 IFG (IMPAIRED FASTING GLUCOSE): ICD-10-CM

## 2024-09-09 DIAGNOSIS — G40.109 TEMPORAL LOBE EPILEPSY (MULTI): ICD-10-CM

## 2024-09-09 RX ORDER — FOLIC ACID 1 MG/1
1 TABLET ORAL DAILY
COMMUNITY

## 2024-09-09 NOTE — ASSESSMENT & PLAN NOTE
Current regimen includes Trulicity 0.75 mg once weekly and topiramate 250 mg BID (for epilepsy but with weight loss benefits). Has lost weight at each appointment since starting therapy plan. Due to continued weight loss and tolerability, plan to continue Trulicity 0.75 mg once weekly.    Monitoring and Education:  All questions and concerns addressed. Contact pharmacist with any further questions or concerns prior to next appointment.  Reviewed dietary recommendations: Healthy Plate method

## 2024-09-09 NOTE — ASSESSMENT & PLAN NOTE
Continue current Trulicity given weight loss progress and history of side effects to higher doses. No cost concerns.    New A1c and BMP before next PCP visit.

## 2024-09-09 NOTE — PROGRESS NOTES
Clinical Pharmacy Appointment    Patient ID: Oly Richard is a 59 y.o. female who presents for Hypertension and Weight Loss    Pt is here for First appointment.     Referring Provider: Alix Concepcion A*  PCP: Susie Malave MD   Last visit with PCP: 7/30/2024   Next visit with PCP: 12/2/2024      Subjective     Interval History  History of side effects to higher than current dose of Trulicity    HPI  HYPERTENSION ASSESSMENT  Medication Therapy  Current Medications: olmesartan 20 mg twice daily  Previous Medications: amlodipine listed as allergy - patient says made her feel very sick; Bystolic- headache    Adherence: Takes medication as directed and reports no missed doses  Adverse Effects: no concerns with side effects    Home BP Monitoring  BP Cuff Type: Digital arm monitor  Cuff Validated? Yes, has checked against school nurse monitor    Current home BP readings: lowest 117/74 mmHg, highest 129/84 mmHg     BP Readings from Last 3 Encounters:   07/30/24 117/74   11/01/23 116/75   10/24/23 130/90       Lifestyle  Diet: 3 meals/day. Only meat is chicken  BK: non-fat yogurt and fruit  LN: turkey wrap or salad and fruit  DN: rice and vegetables and chicken  Snacks: rare if ever  Drinks: flavored water, maybe 1 diet Pepsi, morning coffee    Physical Activity: , up and moving at work, housework, babysits granddaughters as well    Sodium Intake:  salt not added to cooking, salt shaker not on table, and caffeine intake: 24 oz coffee in the morning, sometimes Diet Pepsi    Risk Assessment  Major Risk Factors Include:  Hypertension  Obesity (BMI >30)    Known target organ damage:   CKD    Secondary Prevention  ASCVD Risk:   The 10-year ASCVD risk score (Alissa LUNA, et al., 2019) is: 2.4%    Values used to calculate the score:      Age: 59 years      Sex: Female      Is Non- : No      Diabetic: No      Tobacco smoker: No      Systolic Blood Pressure: 117 mmHg      Is BP  treated: Yes      HDL Cholesterol: 78 mg/dL      Total Cholesterol: 185 mg/dL  On Statin?: No, at goal without statin  Immunizations Needed: Annual Flu, Shingrix, and Hepatitis B Series  Tobacco Use: former smoker, quit 1994     WEIGHT LOSS  BMI Readings from Last 3 Encounters:   07/30/24 30.00 kg/m²   11/01/23 31.98 kg/m²   10/24/23 32.43 kg/m²      Starting weight: 166 lbs. (11/2023)  Current weight: does not recall most recent weight but subjectively feels she continues to lose on Trulicity    Has worked with nutritionist/dietician? No    Other Potential Contributing Factors  Alcohol use: Average 0 drinks/week- maybe once a month 1 enmanuel  Tobacco use: No  Other drug use: No  Medications that may cause weight gain: corticosteroids (prednisone for flares) and venlafaxine, Rinvoq has low risk  Mental health: No current concerns  Eating Disorders: Denies Hx of any eating disorder  Comorbidities: Comorbidities: asthma (uses prn inhaler/medication), anxiety, depressed mood (takes medications), and hypertension (controlled with oral meds)      Non-Pharmacological Therapy  Weight loss techniques attempted:  Self-directed dieting: limited red meat, no added salt    Pharmacological Therapy  Current Medications: Trulicity 0.75 mg once weekly, topiramate 250 mg twice daily  Adverse Effects: rare nausea shortly after injection but not routinely  Previous Medications: attempted higher doses of Trulicity- caused side effects   Cannot use bupropion given hx epilepsy    Insurance coverage of weight-loss medications? Yes, Trulicity covered  Eligible for copay cards/programs? Yes, commercial insurance through school    Medication Reconciliation:  Changed: n/a   Added: folic acid  Discontinued: n/a    Drug Interactions  No relevant drug interactions were noted.    Medication System Management  Patient's preferred pharmacy: [facility] in [place]  Adherence/Organization: appropriate  Affordability/Accessibility: has a payment card for her  Topamax and is now affordable, only $4 and gets Rinvoq; most she has paid is $20 for a medication      Objective   Allergies   Allergen Reactions    Amlodipine Unknown    Dulaglutide Other    Nebivolol Unknown    Sertraline Other     Made her feel weird, weepy    Sulfa (Sulfonamide Antibiotics) Nausea Only    Sulfasalazine Diarrhea     made her feel sick     Social History     Social History Narrative    , 3 sons    Works at [employer]    Former Smoker: Quit !994    Smoked 14yrs, 1/2 ppd    Social ETOH    ---    Family History:    Father: CAD/CABG, dx age 60s;  3/2023 CHF    Mother:  HLD, OP, hypothyroidism    Brother: epilepsy     Sister: passed away  from epileptic seizure     Mat gmother:  at 46 from aneurysm, HTN    Mat gfather:  at 78 from CHF, throat Ca     Pat gmother:  at 62 of kidney failure    Pat gfather:  at 82 of stroke      Medication Review  Current Outpatient Medications   Medication Instructions    albuterol 90 mcg/actuation inhaler INHALE 1 TO 2 PUFFS EVERY 4 TO 6 HOURS AS NEEDED FOR SHORTNESS OF BREATH    cholecalciferol (VITAMIN D-3) 25 mcg, oral, Daily    dulaglutide (Trulicity) 0.75 mg/0.5 mL pen injector 0.75 MG ONCE WEEKLY    folic acid (FOLVITE) 1 mg, oral, Daily    lacosamide (VIMPAT) 100 mg, oral, 2 times daily    leflunomide (ARAVA) 20 mg, oral, Daily    olmesartan (BENICAR) 20 mg, oral, 2 times daily    phenazopyridine (Pyridium) 100 mg tablet Take 1-2 tablets PO tid prn for dysuria    predniSONE (Deltasone) 5 mg tablet TAKE UP TO 3 TABLETS DAILY AS NEEDED FOR SEVERE RHEUMATOID ARTHRITIS FLAREUPS.    Rinvoq 15 mg tablet extended release 24 hr 1 tablet, oral, Daily    rizatriptan (Maxalt) 10 mg tablet TAKE ONE (1) TABLET EVERY TWO (2) HOURS AS NEEDED FOR HEADACHE. MAXIMUM 30 MG PER DAY.    Topamax 200 mg tablet TAKE 1 TABLET BY MOUTH TWICE DAILY. TAKE WITH 50 MG TABLET (TOTAL 250 MG TWICE DAILY)    Topamax 50 mg, oral, 2 times daily    venlafaxine XR  "(EFFEXOR-XR) 150 mg, oral, Daily, Take with food.      Vitals  BP Readings from Last 2 Encounters:   24 117/74   23 116/75     BMI Readings from Last 1 Encounters:   24 30.00 kg/m²      Labs  A1C  Lab Results   Component Value Date    HGBA1C 5.3 2023    HGBA1C 5.5 2019     BMP  Lab Results   Component Value Date    CALCIUM 9.4 2023     2023    K 4.3 2023    CO2 27 2023     (H) 2023    BUN 17 2023    CREATININE 1.07 (H) 2023    EGFR 60 (L) 2023     LFTs  Lab Results   Component Value Date    ALT 20 2023    AST 22 2023    ALKPHOS 56 2023    BILITOT 0.3 2023     FLP  Lab Results   Component Value Date    TRIG 64 10/03/2022    CHOL 185 10/03/2022    LDLF 94 10/03/2022    HDL 78.0 10/03/2022     Urine Microalbumin  No results found for: \"MICROALBCREA\"  Weight Management  Wt Readings from Last 3 Encounters:   24 74.4 kg (164 lb)   23 75.5 kg (166 lb 8 oz)   10/24/23 76.5 kg (168 lb 10.4 oz)      There is no height or weight on file to calculate BMI.     Assessment/Plan   Problem List Items Addressed This Visit       Primary hypertension - Primary     ASSESSMENT OF SMBP MEASUREMENTS  Highest readin/84 mmHg  Lowest readin/74  mmHg  Average: ~120/77  mmHg    Patient's goal BP < 130/80.   Rationale for plan: The patient is well controlled on current medications and adherent to twice daily schedule without concerns. No changes warranted at this time.  CrCl cannot be calculated (Patient's most recent lab result is older than the maximum 3 days allowed.).    Medication Changes:  Continue  Olmesartan 20 mg twice daily    Monitoring and Education Discussed:  Eat right: Your diet should be rich in fruits, vegetables, potassium, and low-fat dairy products. You should also reduce your intake of sodium and fats, particularly saturated fats.  Maintain a healthy weight: Try to achieve and maintain a " healthy weight. If you are unsure what this means for you, please contact our clinic.  Exercise: Try to get at least 30 minutes of aerobic exercise every day.  Moderate your alcohol consumption: Limit your alcohol intake to one drink per day.  Monitor your blood pressure: You should check your blood pressure regularly. Notify our clinic if your blood pressure readings are consistently higher than recommended.          Temporal lobe epilepsy (Multi)     Managed by neurology    Currently affordable after given copay assistance by neurologist.         IFG (impaired fasting glucose)     Continue current Trulicity given weight loss progress and history of side effects to higher doses. No cost concerns.    New A1c and BMP before next PCP visit.         Routine adult health maintenance    Body mass index 30.0-30.9, adult     Current regimen includes Trulicity 0.75 mg once weekly and topiramate 250 mg BID (for epilepsy but with weight loss benefits). Has lost weight at each appointment since starting therapy plan. Due to continued weight loss and tolerability, plan to continue Trulicity 0.75 mg once weekly.    Monitoring and Education:  All questions and concerns addressed. Contact pharmacist with any further questions or concerns prior to next appointment.  Reviewed dietary recommendations: Healthy Plate method             Clinical Pharmacist follow-up: as needed for clinical questions or requested by PCP, Telehealth visit    Continue all meds under the continuation of care with the referring provider and clinical pharmacy team.    Thank you,  Betsy White, PharmD  Clinical Pharmacist  [phone]    Verbal consent to manage patient's drug therapy was obtained from the patient. They were informed they may decline to participate or withdraw from participation in pharmacy services at any time.

## 2024-09-09 NOTE — ASSESSMENT & PLAN NOTE
ASSESSMENT OF SMBP MEASUREMENTS  Highest readin/84 mmHg  Lowest readin/74  mmHg  Average: ~120/77  mmHg    Patient's goal BP < 130/80.   Rationale for plan: The patient is well controlled on current medications and adherent to twice daily schedule without concerns. No changes warranted at this time.  CrCl cannot be calculated (Patient's most recent lab result is older than the maximum 3 days allowed.).    Medication Changes:  Continue  Olmesartan 20 mg twice daily    Monitoring and Education Discussed:  Eat right: Your diet should be rich in fruits, vegetables, potassium, and low-fat dairy products. You should also reduce your intake of sodium and fats, particularly saturated fats.  Maintain a healthy weight: Try to achieve and maintain a healthy weight. If you are unsure what this means for you, please contact our clinic.  Exercise: Try to get at least 30 minutes of aerobic exercise every day.  Moderate your alcohol consumption: Limit your alcohol intake to one drink per day.  Monitor your blood pressure: You should check your blood pressure regularly. Notify our clinic if your blood pressure readings are consistently higher than recommended.

## 2024-10-20 PROBLEM — J40 BRONCHITIS: Status: RESOLVED | Noted: 2023-10-11 | Resolved: 2024-10-20

## 2024-10-22 ENCOUNTER — TELEPHONE (OUTPATIENT)
Dept: PRIMARY CARE | Facility: CLINIC | Age: 59
End: 2024-10-22
Payer: COMMERCIAL

## 2024-10-23 ENCOUNTER — TELEMEDICINE (OUTPATIENT)
Dept: PRIMARY CARE | Facility: CLINIC | Age: 59
End: 2024-10-23
Payer: COMMERCIAL

## 2024-10-23 DIAGNOSIS — R30.0 BURNING WITH URINATION: Primary | ICD-10-CM

## 2024-10-23 PROCEDURE — 1036F TOBACCO NON-USER: CPT | Performed by: NURSE PRACTITIONER

## 2024-10-23 PROCEDURE — 99213 OFFICE O/P EST LOW 20 MIN: CPT | Performed by: NURSE PRACTITIONER

## 2024-10-23 RX ORDER — NITROFURANTOIN 25; 75 MG/1; MG/1
100 CAPSULE ORAL 2 TIMES DAILY
Qty: 10 CAPSULE | Refills: 0 | Status: SHIPPED | OUTPATIENT
Start: 2024-10-23 | End: 2024-10-28

## 2024-10-23 ASSESSMENT — PATIENT HEALTH QUESTIONNAIRE - PHQ9
1. LITTLE INTEREST OR PLEASURE IN DOING THINGS: NOT AT ALL
2. FEELING DOWN, DEPRESSED OR HOPELESS: NOT AT ALL
SUM OF ALL RESPONSES TO PHQ9 QUESTIONS 1 AND 2: 0

## 2024-10-23 NOTE — PROGRESS NOTES
"An interactive audio/visual  telecommunication system which permits real time communications between the patient (at the originating site) and provider (at the distant site) was utilized to provide this telehealth service.    Verbal consent was requested and obtained from the patient for this telehealth visit.  We have confirmed the patient wishes to see me, Amirah Bolivar, a board certified nurse practitioner with an active [place] advanced practice license as well as verified the patient's identity and physical location in [place].    Problem List Items Addressed This Visit    None  Visit Diagnoses       Burning with urination    -  Primary    recently passed stone  prone to UTI  can't do sulfa  macrobid   urine testing if sx persist  prn fu    Relevant Medications    nitrofurantoin, macrocrystal-monohydrate, (Macrobid) 100 mg capsule    Other Relevant Orders    Urinalysis with Reflex Culture and Microscopic             Subjective   Patient ID: Oly Richard is a 59 y.o. female who presents for burning w/urination (burning w/urination passed kidney stone Saturday morning/Does not see any blood in urine, urine color dark yellow drinking plenty of fluids).  HPI  No fever  Urine is darker than normal  No vag discharge  L flank pain persists    66 mL/min  Creatinine clearance, original Cockcroft-Gault    Review of Systems   All other systems reviewed and are negative.      BP Readings from Last 3 Encounters:   07/30/24 117/74   11/01/23 116/75   10/24/23 130/90      Wt Readings from Last 3 Encounters:   07/30/24 74.4 kg (164 lb)   11/01/23 75.5 kg (166 lb 8 oz)   10/24/23 76.5 kg (168 lb 10.4 oz)      BMI:   Estimated body mass index is 30 kg/m² as calculated from the following:    Height as of 7/30/24: 1.575 m (5' 2\").    Weight as of 7/30/24: 74.4 kg (164 lb).    Objective   Physical Exam  Gen: Alert, NAD  Respiratory:  resp effort NL, speaking in complete sentences   Neuro:  coordination intact   Mood: normal  "   Sitting upright

## 2024-10-24 ENCOUNTER — APPOINTMENT (OUTPATIENT)
Dept: PRIMARY CARE | Facility: CLINIC | Age: 59
End: 2024-10-24
Payer: COMMERCIAL

## 2024-11-01 DIAGNOSIS — I10 ESSENTIAL (PRIMARY) HYPERTENSION: ICD-10-CM

## 2024-11-01 RX ORDER — OLMESARTAN MEDOXOMIL 20 MG/1
20 TABLET ORAL 2 TIMES DAILY
Qty: 180 TABLET | Refills: 3 | Status: SHIPPED | OUTPATIENT
Start: 2024-11-01

## 2024-12-01 ASSESSMENT — PROMIS GLOBAL HEALTH SCALE
RATE_GENERAL_HEALTH: GOOD
CARRYOUT_SOCIAL_ACTIVITIES: VERY GOOD
RATE_AVERAGE_FATIGUE: MODERATE
RATE_AVERAGE_FATIGUE: MODERATE
RATE_GENERAL_HEALTH: GOOD
RATE_PHYSICAL_HEALTH: VERY GOOD
RATE_PHYSICAL_HEALTH: VERY GOOD
EMOTIONAL_PROBLEMS: RARELY
RATE_AVERAGE_PAIN: 5
RATE_SOCIAL_SATISFACTION: VERY GOOD
RATE_MENTAL_HEALTH: VERY GOOD
RATE_SOCIAL_SATISFACTION: VERY GOOD
EMOTIONAL_PROBLEMS: RARELY
RATE_AVERAGE_PAIN: 5
RATE_QUALITY_OF_LIFE: VERY GOOD
CARRYOUT_PHYSICAL_ACTIVITIES: MOSTLY
RATE_QUALITY_OF_LIFE: VERY GOOD
CARRYOUT_PHYSICAL_ACTIVITIES: MOSTLY
CARRYOUT_SOCIAL_ACTIVITIES: VERY GOOD
RATE_MENTAL_HEALTH: VERY GOOD

## 2024-12-02 ENCOUNTER — APPOINTMENT (OUTPATIENT)
Dept: PRIMARY CARE | Facility: CLINIC | Age: 59
End: 2024-12-02
Payer: COMMERCIAL

## 2024-12-13 ENCOUNTER — APPOINTMENT (OUTPATIENT)
Dept: PRIMARY CARE | Facility: CLINIC | Age: 59
End: 2024-12-13
Payer: COMMERCIAL

## 2025-01-01 PROBLEM — T45.4X5A ADVERSE EFFECT OF IRON: Status: RESOLVED | Noted: 2023-09-29 | Resolved: 2025-01-01

## 2025-01-23 ENCOUNTER — PATIENT MESSAGE (OUTPATIENT)
Dept: PRIMARY CARE | Facility: CLINIC | Age: 60
End: 2025-01-23
Payer: COMMERCIAL

## 2025-01-23 DIAGNOSIS — R11.0 NAUSEA: ICD-10-CM

## 2025-01-23 RX ORDER — ONDANSETRON 4 MG/1
TABLET, ORALLY DISINTEGRATING ORAL
Qty: 90 TABLET | Refills: 3 | Status: SHIPPED | OUTPATIENT
Start: 2025-01-23

## 2025-01-24 ENCOUNTER — PATIENT MESSAGE (OUTPATIENT)
Dept: PRIMARY CARE | Facility: CLINIC | Age: 60
End: 2025-01-24

## 2025-01-24 ENCOUNTER — LAB (OUTPATIENT)
Dept: LAB | Facility: LAB | Age: 60
End: 2025-01-24
Payer: COMMERCIAL

## 2025-01-24 NOTE — PATIENT COMMUNICATION
Called patient and stated she has active lab orders from 24 that do not  until 25  Patient stated lab told her these are not current labs   Stated she will return in the morning to [facility] to receive BW again.

## 2025-02-02 LAB
25(OH)D3+25(OH)D2 SERPL-MCNC: 41 NG/ML (ref 30–100)
ALBUMIN SERPL-MCNC: 4.4 G/DL (ref 3.6–5.1)
ALP SERPL-CCNC: 52 U/L (ref 37–153)
ALT SERPL-CCNC: 10 U/L (ref 6–29)
ANION GAP SERPL CALCULATED.4IONS-SCNC: 8 MMOL/L (CALC) (ref 7–17)
AST SERPL-CCNC: 15 U/L (ref 10–35)
BASOPHILS # BLD AUTO: 49 CELLS/UL (ref 0–200)
BASOPHILS NFR BLD AUTO: 0.8 %
BILIRUB SERPL-MCNC: 0.3 MG/DL (ref 0.2–1.2)
BUN SERPL-MCNC: 12 MG/DL (ref 7–25)
CALCIUM SERPL-MCNC: 8.9 MG/DL (ref 8.6–10.4)
CHLORIDE SERPL-SCNC: 112 MMOL/L (ref 98–110)
CHOLEST SERPL-MCNC: 221 MG/DL
CHOLEST/HDLC SERPL: 3.4 (CALC)
CO2 SERPL-SCNC: 23 MMOL/L (ref 20–32)
CREAT SERPL-MCNC: 1.03 MG/DL (ref 0.5–1.03)
EGFRCR SERPLBLD CKD-EPI 2021: 63 ML/MIN/1.73M2
EOSINOPHIL # BLD AUTO: 110 CELLS/UL (ref 15–500)
EOSINOPHIL NFR BLD AUTO: 1.8 %
ERYTHROCYTE [DISTWIDTH] IN BLOOD BY AUTOMATED COUNT: 12.5 % (ref 11–15)
EST. AVERAGE GLUCOSE BLD GHB EST-MCNC: 100 MG/DL
EST. AVERAGE GLUCOSE BLD GHB EST-SCNC: 5.5 MMOL/L
FERRITIN SERPL-MCNC: 117 NG/ML (ref 16–232)
GLUCOSE SERPL-MCNC: 87 MG/DL (ref 65–99)
HBA1C MFR BLD: 5.1 % OF TOTAL HGB
HCT VFR BLD AUTO: 36.4 % (ref 35–45)
HDLC SERPL-MCNC: 65 MG/DL
HGB BLD-MCNC: 11.6 G/DL (ref 11.7–15.5)
IRON SATN MFR SERPL: 32 % (CALC) (ref 16–45)
IRON SERPL-MCNC: 85 MCG/DL (ref 45–160)
LDLC SERPL CALC-MCNC: 141 MG/DL (CALC)
LYMPHOCYTES # BLD AUTO: 1232 CELLS/UL (ref 850–3900)
LYMPHOCYTES NFR BLD AUTO: 20.2 %
MCH RBC QN AUTO: 30.9 PG (ref 27–33)
MCHC RBC AUTO-ENTMCNC: 31.9 G/DL (ref 32–36)
MCV RBC AUTO: 97.1 FL (ref 80–100)
MONOCYTES # BLD AUTO: 525 CELLS/UL (ref 200–950)
MONOCYTES NFR BLD AUTO: 8.6 %
NEUTROPHILS # BLD AUTO: 4185 CELLS/UL (ref 1500–7800)
NEUTROPHILS NFR BLD AUTO: 68.6 %
NONHDLC SERPL-MCNC: 156 MG/DL (CALC)
PLATELET # BLD AUTO: 191 THOUSAND/UL (ref 140–400)
PMV BLD REES-ECKER: 12.6 FL (ref 7.5–12.5)
POTASSIUM SERPL-SCNC: 4.1 MMOL/L (ref 3.5–5.3)
PROT SERPL-MCNC: 6.6 G/DL (ref 6.1–8.1)
RBC # BLD AUTO: 3.75 MILLION/UL (ref 3.8–5.1)
SODIUM SERPL-SCNC: 143 MMOL/L (ref 135–146)
TIBC SERPL-MCNC: 264 MCG/DL (CALC) (ref 250–450)
TRIGL SERPL-MCNC: 59 MG/DL
TSH SERPL-ACNC: 0.63 MIU/L (ref 0.4–4.5)
VIT B12 SERPL-MCNC: 1164 PG/ML (ref 200–1100)
WBC # BLD AUTO: 6.1 THOUSAND/UL (ref 3.8–10.8)

## 2025-02-04 ENCOUNTER — APPOINTMENT (OUTPATIENT)
Dept: PRIMARY CARE | Facility: CLINIC | Age: 60
End: 2025-02-04
Payer: COMMERCIAL

## 2025-02-04 ENCOUNTER — TELEPHONE (OUTPATIENT)
Dept: PRIMARY CARE | Facility: CLINIC | Age: 60
End: 2025-02-04

## 2025-02-04 VITALS
SYSTOLIC BLOOD PRESSURE: 105 MMHG | BODY MASS INDEX: 28.85 KG/M2 | OXYGEN SATURATION: 97 % | WEIGHT: 156.8 LBS | TEMPERATURE: 98 F | HEIGHT: 62 IN | DIASTOLIC BLOOD PRESSURE: 71 MMHG | HEART RATE: 84 BPM

## 2025-02-04 DIAGNOSIS — H93.19 TINNITUS, UNSPECIFIED LATERALITY: ICD-10-CM

## 2025-02-04 DIAGNOSIS — E78.2 HYPERLIPIDEMIA, MIXED: ICD-10-CM

## 2025-02-04 DIAGNOSIS — Z13.6 SCREENING FOR CARDIOVASCULAR CONDITION: ICD-10-CM

## 2025-02-04 DIAGNOSIS — Z00.00 ROUTINE ADULT HEALTH MAINTENANCE: Primary | ICD-10-CM

## 2025-02-04 DIAGNOSIS — Z78.0 MENOPAUSE: ICD-10-CM

## 2025-02-04 DIAGNOSIS — Z12.31 ENCOUNTER FOR SCREENING MAMMOGRAM FOR BREAST CANCER: ICD-10-CM

## 2025-02-04 DIAGNOSIS — I10 PRIMARY HYPERTENSION: ICD-10-CM

## 2025-02-04 DIAGNOSIS — R73.01 IFG (IMPAIRED FASTING GLUCOSE): ICD-10-CM

## 2025-02-04 DIAGNOSIS — M06.9 RHEUMATOID ARTHRITIS INVOLVING MULTIPLE SITES, UNSPECIFIED WHETHER RHEUMATOID FACTOR PRESENT (MULTI): ICD-10-CM

## 2025-02-04 DIAGNOSIS — D64.9 ANEMIA, UNSPECIFIED TYPE: ICD-10-CM

## 2025-02-04 DIAGNOSIS — Z13.820 SCREENING FOR OSTEOPOROSIS: ICD-10-CM

## 2025-02-04 DIAGNOSIS — R94.4 DECREASED GFR: ICD-10-CM

## 2025-02-04 DIAGNOSIS — L72.0 INFECTED EPIDERMOID CYST: ICD-10-CM

## 2025-02-04 DIAGNOSIS — F41.8 MIXED ANXIETY DEPRESSIVE DISORDER: ICD-10-CM

## 2025-02-04 DIAGNOSIS — G40.109 TEMPORAL LOBE EPILEPSY (MULTI): ICD-10-CM

## 2025-02-04 DIAGNOSIS — E53.8 VITAMIN B12 DEFICIENCY: ICD-10-CM

## 2025-02-04 DIAGNOSIS — L08.9 INFECTED EPIDERMOID CYST: ICD-10-CM

## 2025-02-04 DIAGNOSIS — E55.9 VITAMIN D DEFICIENCY: ICD-10-CM

## 2025-02-04 PROBLEM — E03.9 ACQUIRED HYPOTHYROIDISM: Status: RESOLVED | Noted: 2023-08-12 | Resolved: 2025-02-04

## 2025-02-04 PROBLEM — R39.9 SYMPTOMS INVOLVING URINARY SYSTEM: Status: RESOLVED | Noted: 2023-10-11 | Resolved: 2025-02-04

## 2025-02-04 PROBLEM — Z86.2 H/O IRON DEFICIENCY ANEMIA: Status: RESOLVED | Noted: 2023-05-17 | Resolved: 2025-02-04

## 2025-02-04 PROBLEM — G40.909 EPILEPSY: Status: RESOLVED | Noted: 2023-05-17 | Resolved: 2025-02-04

## 2025-02-04 PROBLEM — N20.0 BILATERAL NEPHROLITHIASIS: Status: RESOLVED | Noted: 2023-10-11 | Resolved: 2025-02-04

## 2025-02-04 PROBLEM — E61.1 IRON DEFICIENCY: Status: RESOLVED | Noted: 2023-09-29 | Resolved: 2025-02-04

## 2025-02-04 PROBLEM — R63.5 WEIGHT GAIN: Status: RESOLVED | Noted: 2023-10-11 | Resolved: 2025-02-04

## 2025-02-04 PROBLEM — N95.1 MENOPAUSAL SYNDROME: Status: RESOLVED | Noted: 2023-08-12 | Resolved: 2025-02-04

## 2025-02-04 PROBLEM — E88.819 INSULIN RESISTANCE: Status: RESOLVED | Noted: 2023-05-17 | Resolved: 2025-02-04

## 2025-02-04 PROCEDURE — 3074F SYST BP LT 130 MM HG: CPT | Performed by: INTERNAL MEDICINE

## 2025-02-04 PROCEDURE — 99396 PREV VISIT EST AGE 40-64: CPT | Performed by: INTERNAL MEDICINE

## 2025-02-04 PROCEDURE — 1036F TOBACCO NON-USER: CPT | Performed by: INTERNAL MEDICINE

## 2025-02-04 PROCEDURE — 3008F BODY MASS INDEX DOCD: CPT | Performed by: INTERNAL MEDICINE

## 2025-02-04 PROCEDURE — 3078F DIAST BP <80 MM HG: CPT | Performed by: INTERNAL MEDICINE

## 2025-02-04 RX ORDER — CEPHALEXIN 500 MG/1
500 CAPSULE ORAL 2 TIMES DAILY
Qty: 10 CAPSULE | Refills: 0 | Status: SHIPPED | OUTPATIENT
Start: 2025-02-04 | End: 2025-02-09

## 2025-02-04 RX ORDER — VENLAFAXINE HYDROCHLORIDE 150 MG/1
150 CAPSULE, EXTENDED RELEASE ORAL DAILY
Qty: 90 CAPSULE | Refills: 3 | Status: SHIPPED | OUTPATIENT
Start: 2025-02-04 | End: 2026-02-04

## 2025-02-04 RX ORDER — OLMESARTAN MEDOXOMIL 20 MG/1
20 TABLET ORAL 2 TIMES DAILY
Qty: 180 TABLET | Refills: 3 | Status: SHIPPED | OUTPATIENT
Start: 2025-02-04

## 2025-02-04 NOTE — PROGRESS NOTES
ANNUAL CPE VISIT  Chief Complaint   Patient presents with    Annual Exam     HPI: Reviewed chart/medical care received since last seen by me.  Saw Rheum 12/30/24 (copied)  Discussed the current status of her rheumatoid arthritis as mild to modestly active.  I recommend she maintain the Rinvoq 15 mg daily and the leflunomide 20 mg daily along with prn Prednisone.  I refilled the Rinvoq and leflunomide for a 90 days supply.  I refilled the prednisone for a 1 month supply and 5 additional refills.  She has an upcoming physical with her PCP and he will be doing comprehensive labs at that appointment and we will utilize those for safety monitoring for the Rinvoq and leflunomide.     C/o tinnitus 4-5 months  Worse at night  No med changes  Changed diet a little- more blueberries  No vertigo  Mild hearing loss  Seems bilateral  Looked up meds: Effexor: Otic: Tinnitus     Labs reviewed:  Component      Latest Ref Rng 2/1/2025   WHITE BLOOD CELL COUNT      3.8 - 10.8 Thousand/uL 6.1    RED BLOOD CELL COUNT      3.80 - 5.10 Million/uL 3.75 (L)    HEMOGLOBIN      11.7 - 15.5 g/dL 11.6 (L)    HEMATOCRIT      35.0 - 45.0 % 36.4    MCV      80.0 - 100.0 fL 97.1    MCH      27.0 - 33.0 pg 30.9    MCHC      32.0 - 36.0 g/dL 31.9 (L)    RDW      11.0 - 15.0 % 12.5    PLATELET COUNT      140 - 400 Thousand/uL 191    MPV      7.5 - 12.5 fL 12.6 (H)    ABSOLUTE NEUTROPHILS      1,500 - 7,800 cells/uL 4,185    ABSOLUTE LYMPHOCYTES      850 - 3,900 cells/uL 1,232    ABSOLUTE MONOCYTES      200 - 950 cells/uL 525    ABSOLUTE EOSINOPHILS      15 - 500 cells/uL 110    ABSOLUTE BASOPHILS      0 - 200 cells/uL 49    NEUTROPHILS      % 68.6    LYMPHOCYTES      % 20.2    MONOCYTES      % 8.6    EOSINOPHILS      % 1.8    BASOPHILS      % 0.8    GLUCOSE      65 - 99 mg/dL 87    UREA NITROGEN (BUN)      7 - 25 mg/dL 12    CREATININE      0.50 - 1.03 mg/dL 1.03    EGFR      > OR = 60 mL/min/1.73m2 63    SODIUM      135 - 146 mmol/L 143     POTASSIUM      3.5 - 5.3 mmol/L 4.1    CHLORIDE      98 - 110 mmol/L 112 (H)    CARBON DIOXIDE      20 - 32 mmol/L 23    ELECTROLYTE BALANCE      7 - 17 mmol/L (calc) 8    CALCIUM      8.6 - 10.4 mg/dL 8.9    PROTEIN, TOTAL      6.1 - 8.1 g/dL 6.6    ALBUMIN      3.6 - 5.1 g/dL 4.4    BILIRUBIN, TOTAL      0.2 - 1.2 mg/dL 0.3    ALKALINE PHOSPHATASE      37 - 153 U/L 52    AST      10 - 35 U/L 15    ALT      6 - 29 U/L 10    CHOLESTEROL, TOTAL      <200 mg/dL 221 (H)    HDL CHOLESTEROL      > OR = 50 mg/dL 65    TRIGLYCERIDES      <150 mg/dL 59    LDL-CHOLESTEROL      mg/dL (calc) 141 (H)    CHOL/HDLC RATIO      <5.0 (calc) 3.4    NON HDL CHOLESTEROL      <130 mg/dL (calc) 156 (H)    IRON, TOTAL      45 - 160 mcg/dL 85    IRON BINDING CAPACITY      250 - 450 mcg/dL (calc) 264    % SATURATION      16 - 45 % (calc) 32    HEMOGLOBIN A1c      <5.7 % of total Hgb 5.1    eAG (mg/dL)      mg/dL 100    eAG (mmol/L)      mmol/L 5.5    FERRITIN      16 - 232 ng/mL 117    VITAMIN B12      200 - 1,100 pg/mL 1,164 (H)    TSH      0.40 - 4.50 mIU/L 0.63    VITAMIN D,25-OH,TOTAL,IA      30 - 100 ng/mL 41       Used body deodorant and has a blister under her arm onleft      Assessment and Plan:  Problem List Items Addressed This Visit          High    Routine adult health maintenance - Primary    Overview     Declined COVID 19 vaccine, shingrix, influenza vaccines  DT(PEDIATRIC)1/1/2000   Hepatitis B Adult1/1/2000   PPD ([place])1/1/2000   Tdap (Age 7+) 6/23/2015, 10/26/20   Cscope 2012 (10yrs), 10/12/23 (5y)  s/p CARLOS, left ovary remains  Mamm 8/14/19  Hep C (-) 6/23/15  PNeumovax 2017? at [facility]         Current Assessment & Plan     Annual Wellness exam completed   Preventive Health History reviewed  Ordered:   Labs    Mammogram   BMD            Relevant Orders    Comprehensive Metabolic Panel    CBC and Auto Differential    Lipid Panel    Urinalysis with Reflex Microscopic       Medium    Anemia    Overview     Likely  ACD  Likely due to RA/meds         Current Assessment & Plan     Check folate         Relevant Orders    Folate    BMI 28.0-28.9,adult    Overview     On Trulicity ( higher dose  caused GI side effects)         Decreased GFR    Overview     On Benicar  ? Due to HTN vs meds         Current Assessment & Plan     Repeat hydrated  Check Cystatin C         Relevant Orders    Basic metabolic panel    Cystatin C    Encounter for screening mammogram for breast cancer    Relevant Orders    BI mammo bilateral screening tomosynthesis    Hyperlipidemia, mixed    Current Assessment & Plan     Will get CT calcium score         Relevant Orders    CT cardiac scoring wo IV contrast    TSH with reflex to Free T4 if abnormal    IFG (impaired fasting glucose)    Overview     8/23:   On Trulicity (higher doses caused AE) in past  On Zepbound          Relevant Orders    Hemoglobin A1C    Menopause    Relevant Orders    XR DEXA bone density    Mixed anxiety depressive disorder    Overview     Zoloft made her feel weird  Would avoid Wellbutrin due to hx seizures  Started Effexor 9/22;         Relevant Medications    venlafaxine XR (Effexor-XR) 150 mg 24 hr capsule    Primary hypertension    Overview     Goal <130/80  Bystolic caused hair loss (?)  Norvasc caused GI symptoms    On Benicar         Relevant Medications    olmesartan (BENIcar) 20 mg tablet    Other Relevant Orders    CT cardiac scoring wo IV contrast    Albumin-Creatinine Ratio, Urine Random    Rheumatoid arthritis    Overview     On Arava (used to take Enbrel, remission) and  Started on Rinvoq 5/21  Managed by Rheum/Dr Ba [facility]  12/23 Dr Ba: Discussed the current status of her rheumatoid arthritis as mild to modestly active. I recommend she maintain the Rinvoq 15 mg daily and the leflunomide 20 mg daily. She will reach out to me through [employer] if she needs refills. I reviewed recent labs showing the anemia and her hematologist is continue to monitor lab and  "has upcoming labs in the next 2 weeks. I will await those results. We discussed intermittent flareups and the use of prednisone is acceptable as she does this on a sparing basis. If she needs refills on this she will let me know.          Screening for osteoporosis    Relevant Orders    XR DEXA bone density    Temporal lobe epilepsy (Multi)    Overview     Left frontotemporal Typical partial seizures but has had 4-5 Generalized tonic clonic seizures  On Vimpat and Topamax  Managed by Neurology (Dr Gonsalez [facility])         Tinnitus    Overview     ? Cause, ? Med  Per PharmD:   Arava can cause neuropathies but does not normally present as tinnitus   She is on venlafaxine and rizatriptan- both have a very low risk noted in post-marketing; sertraline is the serotonergic medication most closely associated with tinnitus   Topiramate also has a very low risk noted post-marketing, not nearly as high as other similar meds; same with lacosamide   Will get audiology test first         Relevant Orders    Referral to Audiology    Vitamin B12 deficiency    Overview     2021: 271  On 2000 mcginternational units level was too high  Decreased to 1000mcg I 8/23 (level supratherapeutic on 1K daily)             Current Assessment & Plan     Drop to 5x/week         Relevant Orders    Vitamin B12    Vitamin D deficiency    Overview     Goal 30-40(hx kidney stones)  Not supplemented         Relevant Orders    Vitamin D 25-Hydroxy,Total (for eval of Vitamin D levels)    XR DEXA bone density     Other Visit Diagnoses       Screening for cardiovascular condition        Relevant Orders    CT cardiac scoring wo IV contrast    Infected epidermoid cyst        Relevant Medications    cephalexin (Keflex) 500 mg capsule              ROS otherwise negative aside from what was mentioned above in HPI.    Vitals  /71   Pulse 84   Temp 36.7 °C (98 °F)   Ht 1.575 m (5' 2\")   Wt 71.1 kg (156 lb 12.8 oz)   SpO2 97%   BMI 28.68 kg/m²   Body mass index " is 28.68 kg/m².  Physical Exam  Gen: Alert, NAD  HEENT:  Normal exam  Neck:  No masses/nodes palpable; Thyroid nontender and without nodules; No KEITH  Respiratory:  Lungs CTAB  CV: RRR  Neuro:  Gross motor and sensory intact  Skin: left axillary supepidermal cyst present, already draining purulent material  Breast: No masses or axillary lymphadenopathy  Gyn: Normal pelvic exam: no vaginal D/C. No ovarian or adnexal masses; No external vaginal or anal/perineal lesions (Pt declined chaperone)    Allergies and Medications  Amlodipine, Dulaglutide, Nebivolol, Sertraline, Sulfa (sulfonamide antibiotics), and Sulfasalazine  Current Outpatient Medications   Medication Instructions    cephalexin (KEFLEX) 500 mg, oral, 2 times daily    lacosamide (VIMPAT) 100 mg, 2 times daily    leflunomide (ARAVA) 20 mg, Daily    olmesartan (BENICAR) 20 mg, oral, 2 times daily    ondansetron ODT (Zofran-ODT) 4 mg disintegrating tablet Take 1 tablet every 8 hours as as needs for nausea.    predniSONE (Deltasone) 5 mg tablet TAKE UP TO 3 TABLETS DAILY AS NEEDED FOR SEVERE RHEUMATOID ARTHRITIS FLAREUPS.    Rinvoq 15 mg tablet extended release 24 hr 1 tablet, Daily    rizatriptan (Maxalt) 10 mg tablet TAKE ONE (1) TABLET EVERY TWO (2) HOURS AS NEEDED FOR HEADACHE. MAXIMUM 30 MG PER DAY.    tirzepatide (weight loss) (ZEPBOUND) 5 mg, subcutaneous, Every 7 days    Topamax 200 mg tablet TAKE 1 TABLET BY MOUTH TWICE DAILY. TAKE WITH 50 MG TABLET (TOTAL 250 MG TWICE DAILY)    Topamax 50 mg, 2 times daily    venlafaxine XR (EFFEXOR-XR) 150 mg, oral, Daily, Take with food.       Active Problem List  Patient Active Problem List   Diagnosis    Mixed anxiety depressive disorder    Calculus of kidney    Primary hypertension    Vitamin B12 deficiency    Psoriasis    Rheumatoid arthritis    Temporal lobe epilepsy (Multi)    Vitamin D deficiency    Anemia    IFG (impaired fasting glucose)    Routine adult health maintenance    BMI 28.0-28.9,adult    Migraine  headache    Chronic insomnia    Decreased GFR    Tinnitus    Hyperlipidemia, mixed    Encounter for screening mammogram for breast cancer    Menopause    Screening for osteoporosis       Comprehensive Medical/Surgical/Social/Family History  Past Medical History:   Diagnosis Date    Anemia     Anxiety     Arthritis 10/01/2008    Asthma     Depression     Epilepsy     H/O chest x-ray     10/16: normal : normal    Hypertension      Past Surgical History:   Procedure Laterality Date     SECTION, LOW TRANSVERSE  85/92/98    COLONOSCOPY  2019    2012: Impression:     - Tortuous colon.           - The examination was otherwise normal on direct and           retroflexion views.    COLONOSCOPY  10/12/2023    Scattered diverticulosis of moderate severity, Hemorrhoids (repeat 5 yrs)    ESOPHAGOGASTRODUODENOSCOPY  10/12/2023    The esophagus, stomach and duodenum appeared normal.  Performed forceps biopsies in the 2nd part of the duodenum to rule out celiac disease (normal)    HYSTERECTOMY  2019    left ovary remains    OTHER SURGICAL HISTORY  2019     section    OTHER SURGICAL HISTORY  2019    Knee arthrotomy       Social History     Social History Narrative    Social History:    , 3 sons    Works at [employer]    Former Smoker: Quit , Smoked 14yrs, 1/2 ppd    Occasional ETOH use    ---    Family History:    F: CAD/CABG, dx age 60s;  CHF ( age 84)    M:  HLD, OP, hypothyroidism    B: epilepsy     Sister: Epileptic seizure  ( age 40s)    Mat gmother:  at 46 from aneurysm, HTN    Mat gfather:  at 78 from CHF, throat Ca     Pat gmother:  at 62 of kidney failure    Pat gfather:  at 82 of stroke

## 2025-02-04 NOTE — TELEPHONE ENCOUNTER
----- Message from Susie Malvae sent at 2/4/2025 12:00 PM EST -----  Can relay: Arava can cause neuropathies but does not normally present as tinnitus   She is on venlafaxine and rizatriptan- both have a very low risk noted in post-marketing  Topiramate also has a very low risk noted post-marketing, not nearly as high as other similar meds; same with lacosamide     Do the hearing test first as we discussed  If nothing found to explain it then we can discuss these meds as cause- would have her reach out to her neurologist and rheumatologist for the meds they prescribe  ----- Message -----  From: Betsy White, PharmUZMA  Sent: 2/4/2025  10:38 AM EST  To: Susie Malave MD    Nothing that screams tinnitus to me    Arava can cause neuropathies but does not normally present as tinnitus    She is on venlafaxine and rizatriptan- both have a very low risk noted in post-marketing; sertraline is the serotonergic medication most closely associated with tinnitus    Topiramate also has a very low risk noted post-marketing, not nearly as high as other similar meds; same with lacosamide  ----- Message -----  From: Susie Malave MD  Sent: 2/4/2025   9:37 AM EST  To: Betsy White, PharmUZMA    Any of her meds culprit for her severe tinnitus?

## 2025-02-04 NOTE — TELEPHONE ENCOUNTER
Relayed to patient verbally understood and acknowledged  Patient did state that she will not be changing her medications at all

## 2025-02-04 NOTE — ASSESSMENT & PLAN NOTE
Annual Wellness exam completed   Preventive Health History reviewed  Ordered:   Labs    Mammogram   BMD

## 2025-04-08 ENCOUNTER — HOSPITAL ENCOUNTER (OUTPATIENT)
Dept: RADIOLOGY | Facility: CLINIC | Age: 60
Discharge: HOME | End: 2025-04-08
Payer: COMMERCIAL

## 2025-04-08 VITALS — BODY MASS INDEX: 28.85 KG/M2 | WEIGHT: 156.8 LBS | HEIGHT: 62 IN

## 2025-04-08 DIAGNOSIS — Z78.0 MENOPAUSE: ICD-10-CM

## 2025-04-08 DIAGNOSIS — E78.2 HYPERLIPIDEMIA, MIXED: ICD-10-CM

## 2025-04-08 DIAGNOSIS — Z12.31 ENCOUNTER FOR SCREENING MAMMOGRAM FOR BREAST CANCER: ICD-10-CM

## 2025-04-08 DIAGNOSIS — E55.9 VITAMIN D DEFICIENCY: ICD-10-CM

## 2025-04-08 DIAGNOSIS — Z13.6 SCREENING FOR CARDIOVASCULAR CONDITION: ICD-10-CM

## 2025-04-08 DIAGNOSIS — Z13.820 SCREENING FOR OSTEOPOROSIS: ICD-10-CM

## 2025-04-08 DIAGNOSIS — I10 PRIMARY HYPERTENSION: ICD-10-CM

## 2025-04-08 PROCEDURE — 77063 BREAST TOMOSYNTHESIS BI: CPT

## 2025-04-08 PROCEDURE — 77067 SCR MAMMO BI INCL CAD: CPT | Performed by: RADIOLOGY

## 2025-04-08 PROCEDURE — 77080 DXA BONE DENSITY AXIAL: CPT | Performed by: RADIOLOGY

## 2025-04-08 PROCEDURE — 77080 DXA BONE DENSITY AXIAL: CPT

## 2025-04-08 PROCEDURE — 75571 CT HRT W/O DYE W/CA TEST: CPT

## 2025-04-08 PROCEDURE — 77063 BREAST TOMOSYNTHESIS BI: CPT | Performed by: RADIOLOGY

## 2025-04-10 ENCOUNTER — TELEMEDICINE (OUTPATIENT)
Dept: PRIMARY CARE | Facility: CLINIC | Age: 60
End: 2025-04-10
Payer: COMMERCIAL

## 2025-04-10 DIAGNOSIS — M06.9 RHEUMATOID ARTHRITIS INVOLVING MULTIPLE SITES, UNSPECIFIED WHETHER RHEUMATOID FACTOR PRESENT (MULTI): ICD-10-CM

## 2025-04-10 DIAGNOSIS — I10 PRIMARY HYPERTENSION: ICD-10-CM

## 2025-04-10 DIAGNOSIS — G40.109 TEMPORAL LOBE EPILEPSY (MULTI): ICD-10-CM

## 2025-04-10 DIAGNOSIS — M54.12 CERVICAL RADICULOPATHY: Primary | ICD-10-CM

## 2025-04-10 PROCEDURE — 99214 OFFICE O/P EST MOD 30 MIN: CPT | Performed by: INTERNAL MEDICINE

## 2025-04-10 PROCEDURE — 1036F TOBACCO NON-USER: CPT | Performed by: INTERNAL MEDICINE

## 2025-04-10 RX ORDER — OXYCODONE HYDROCHLORIDE 5 MG/1
5 TABLET ORAL EVERY 4 HOURS PRN
Qty: 42 TABLET | Refills: 0 | Status: SHIPPED | OUTPATIENT
Start: 2025-04-10 | End: 2025-04-13 | Stop reason: SDUPTHER

## 2025-04-10 RX ORDER — PREDNISONE 20 MG/1
TABLET ORAL
Qty: 18 TABLET | Refills: 0 | Status: SHIPPED | OUTPATIENT
Start: 2025-04-10

## 2025-04-10 NOTE — PROGRESS NOTES
An interactive telecommunication system which permits real time communications between the patient (at the originating site) and provider (at the distant site) was utilized to provide this telehealth service.    Verbal consent was requested and obtained from the patient for this telehealth visit.    We have confirmed the patient wishes to see me, Dr. Susie Malave, a board certified Internal Medicine physician with an active [place] medical license as well as verified the patient's identity and physical location in [place].  Audio and Visual both.  CC/HPI:   Pain (BP is 178/117 this am).  Saturday went for massage upper body  Lots of stress at schools/work  Felt good right after but then developed a knot over her left shoulder blade, pain down arm and tingling in the hand  Feels like pinched nerve  Thjnks massage aggravated this (has had it before)  Sx started later that night after the massage  Unbearable  Took Tylenol 500 mg and Ibuprofen 600 mg every 6 hours then every 4hours  Sx felt worse so took some steroids she had at home, 10mg TID on Sunday and then  x  30mg Monday (no relief at all) and 25mg Tuesday and 20 mg yesterday and 5mg this AM  Increased tylenol 1000 mg and using Lidocaine  Stretching also  Has used tramadol     Assessment and Plan:  Problem List Items Addressed This Visit          Medium    Primary hypertension    Overview     Goal <130/80  Bystolic caused hair loss (?)  Norvasc caused GI symptoms    On Benicar         Current Assessment & Plan     Bp likely elevated due to pain  Will see what does when feeling better         Rheumatoid arthritis    Overview     On Arava (used to take Enbrel, remission) and Started on Rinvoq 5/21  Managed by Rheum/Dr Ba [facility]  Uses prn steroids infrequently          Temporal lobe epilepsy (Multi)    Overview     Left frontotemporal Typical partial seizures but has had 4-5 Generalized tonic clonic seizures  On Vimpat and Topamax  Managed by Neurology (Dr Gonsalez [facility])          Current Assessment & Plan     PharmD looked into Sz risk with Opioids and no incraesed risk          Other Visit Diagnoses       Cervical radiculopathy    -  Primary    will treat empirically with higher dose steroid (underatdns risk while on Rinvoq)  and Tyle 1000mg q 8 hrs  Prn opiate  May need imaging/Spine referral    Relevant Medications    oxyCODONE (Roxicodone) 5 mg immediate release tablet    predniSONE (Deltasone) 20 mg tablet            ROS otherwise negative aside from what was mentioned above in HPI.    Vitals  There were no vitals taken for this visit.  There is no height or weight on file to calculate BMI.  Physical Exam  Gen: Alert, NAD  HEENT: Unremarkable      Allergies and Medications  Amlodipine, Dulaglutide, Nebivolol, Sertraline, Sulfa (sulfonamide antibiotics), and Sulfasalazine  Current Outpatient Medications   Medication Instructions    lacosamide (VIMPAT) 100 mg, 2 times daily    leflunomide (ARAVA) 20 mg, Daily    olmesartan (BENICAR) 20 mg, oral, 2 times daily    ondansetron ODT (Zofran-ODT) 4 mg disintegrating tablet Take 1 tablet every 8 hours as as needs for nausea.    oxyCODONE (ROXICODONE) 5 mg, oral, Every 4 hours PRN    predniSONE (Deltasone) 20 mg tablet Take 3 tabs (60mg) daily for 3 days, then take 2 tabs (40mg) daily for 3 days, then take 1 tab (20mg) daily for 3 days.    predniSONE (Deltasone) 5 mg tablet TAKE UP TO 3 TABLETS DAILY AS NEEDED FOR SEVERE RHEUMATOID ARTHRITIS FLAREUPS.    Rinvoq 15 mg tablet extended release 24 hr 1 tablet, Daily    rizatriptan (Maxalt) 10 mg tablet TAKE ONE (1) TABLET EVERY TWO (2) HOURS AS NEEDED FOR HEADACHE. MAXIMUM 30 MG PER DAY.    tirzepatide (weight loss) (ZEPBOUND) 5 mg, subcutaneous, Every 7 days    Topamax 200 mg tablet TAKE 1 TABLET BY MOUTH TWICE DAILY. TAKE WITH 50 MG TABLET (TOTAL 250 MG TWICE DAILY)    Topamax 50 mg, 2 times daily    venlafaxine XR (EFFEXOR-XR) 150 mg, oral, Daily, Take with food.

## 2025-04-11 ENCOUNTER — HOSPITAL ENCOUNTER (OUTPATIENT)
Dept: RADIOLOGY | Facility: EXTERNAL LOCATION | Age: 60
Discharge: HOME | End: 2025-04-11

## 2025-04-11 DIAGNOSIS — Z12.31 ENCOUNTER FOR SCREENING MAMMOGRAM FOR BREAST CANCER: ICD-10-CM

## 2025-04-15 ENCOUNTER — APPOINTMENT (OUTPATIENT)
Dept: RADIOLOGY | Facility: HOSPITAL | Age: 60
End: 2025-04-15
Payer: COMMERCIAL

## 2025-04-15 ENCOUNTER — HOSPITAL ENCOUNTER (OUTPATIENT)
Dept: RADIOLOGY | Facility: HOSPITAL | Age: 60
Discharge: HOME | End: 2025-04-15
Payer: COMMERCIAL

## 2025-04-15 DIAGNOSIS — M06.9 RHEUMATOID ARTHRITIS INVOLVING MULTIPLE SITES, UNSPECIFIED WHETHER RHEUMATOID FACTOR PRESENT (MULTI): ICD-10-CM

## 2025-04-15 DIAGNOSIS — M54.12 CERVICAL RADICULOPATHY: ICD-10-CM

## 2025-04-15 PROBLEM — G93.5 CHIARI I MALFORMATION (MULTI): Status: ACTIVE | Noted: 2025-04-15

## 2025-04-15 PROBLEM — M47.812 CERVICAL SPONDYLOSIS: Status: ACTIVE | Noted: 2025-04-15

## 2025-04-15 PROBLEM — M48.02 CERVICAL SPINAL STENOSIS: Status: ACTIVE | Noted: 2025-04-15

## 2025-04-15 PROCEDURE — 72141 MRI NECK SPINE W/O DYE: CPT

## 2025-04-16 ENCOUNTER — TELEMEDICINE (OUTPATIENT)
Dept: PRIMARY CARE | Facility: CLINIC | Age: 60
End: 2025-04-16
Payer: COMMERCIAL

## 2025-04-16 VITALS — SYSTOLIC BLOOD PRESSURE: 154 MMHG | DIASTOLIC BLOOD PRESSURE: 98 MMHG

## 2025-04-16 DIAGNOSIS — M48.02 CERVICAL SPINAL STENOSIS: Primary | ICD-10-CM

## 2025-04-16 PROCEDURE — 3080F DIAST BP >= 90 MM HG: CPT | Performed by: NURSE PRACTITIONER

## 2025-04-16 PROCEDURE — 3077F SYST BP >= 140 MM HG: CPT | Performed by: NURSE PRACTITIONER

## 2025-04-16 PROCEDURE — 99213 OFFICE O/P EST LOW 20 MIN: CPT | Performed by: NURSE PRACTITIONER

## 2025-04-16 RX ORDER — CYCLOBENZAPRINE HCL 5 MG
TABLET ORAL
Qty: 60 TABLET | Refills: 0 | Status: SHIPPED | OUTPATIENT
Start: 2025-04-16

## 2025-04-16 NOTE — PROGRESS NOTES
An interactive audio/visual  telecommunication system which permits real time communications between the patient (at the originating site) and provider (at the distant site) was utilized to provide this telehealth service.    Verbal consent was requested and obtained from the patient for this telehealth visit.  We have confirmed the patient wishes to see me, Amirah Bolivar, a board certified nurse practitioner with an active [place] advanced practice license as well as verified the patient's identity and physical location in [place].    Problem List Items Addressed This Visit          Medium    Cervical spinal stenosis - Primary    Overview   MRI 4/15/25: 1. Moderate spinal canal stenosis at C5-C6 and C6-C7 due to degenerative changes.  2. Additional multilevel degenerative changes of the cervical spine.  3. Findings consistent with Chiari 1 malformation.          Relevant Medications    cyclobenzaprine (Flexeril) 5 mg tablet        Subjective   Patient ID: Oly Richard is a 59 y.o. female who presents for requesting medication (Requesting muscle relaxer has a pinched nerve in left shoulder radiating down her arm having muscle spasms).  HPI  Had deep tissue massage on Saturday  Spasm down her arm down into her wrist  Top of her fingers are numb    No chest pain or palps  No dyspnea  No jaw pain    Elly wo much relief  Is on steroid taper also  Salonpas  Val[place]ren    4/15/25: MRI c-spine  1. Moderate spinal canal stenosis at C5-C6 and C6-C7 due to  degenerative changes.      2. Additional multilevel degenerative changes of the cervical spine  as detailed above.      3. Findings consistent with Chiari 1 malformation.    Review of Systems   All other systems reviewed and are negative.      BP Readings from Last 3 Encounters:   04/16/25 (!) 154/98   02/04/25 105/71   07/30/24 117/74      Wt Readings from Last 3 Encounters:   04/15/25 70.8 kg (156 lb)   04/08/25 71.1 kg (156 lb 12.8 oz)   02/04/25 71.1 kg (156 lb 12.8 oz)     "  BMI:   Estimated body mass index is 28.53 kg/m² as calculated from the following:    Height as of 4/15/25: 1.575 m (5' 2\").    Weight as of 4/15/25: 70.8 kg (156 lb).    Objective   Physical Exam  Gen: Alert, NAD  Respiratory:  resp effort NL, speaking in complete sentences   Neuro:  coordination intact   Mood: normal    Sitting upright       "

## 2025-04-17 ENCOUNTER — OFFICE VISIT (OUTPATIENT)
Dept: ORTHOPEDIC SURGERY | Facility: CLINIC | Age: 60
End: 2025-04-17
Payer: COMMERCIAL

## 2025-04-17 DIAGNOSIS — M54.12 CERVICAL RADICULOPATHY: Primary | ICD-10-CM

## 2025-04-17 RX ORDER — NAPROXEN 500 MG/1
500 TABLET ORAL
Qty: 28 TABLET | Refills: 1 | Status: SHIPPED | OUTPATIENT
Start: 2025-04-17 | End: 2025-05-15

## 2025-04-17 NOTE — PROGRESS NOTES
Acute Injury New Patient Visit    HPI: Oly is a 59 y.o.female who presents today with new complaints of neck pain and left shoulder and elbow pain.  About 10 days ago, she began having worsening neck pain with radiation of burning pain and numbness and tingling down through the shoulder, elbow, and into her hand.  She had a deep tissue massage at this time.  Denies any falls or injuries.  She was seen by her primary care doctor, and placed on oxycodone, Flexeril, Tylenol, Motrin, Voltaren, prednisone taper, and lidocaine patches.  Nothing seems to be working.  She had an MRI ordered which showed moderate spinal canal stenosis at C5-C6 and C6-C7 due to degenerative changes, additional multilevel degenerative changes of the cervical spine including neuroforaminal narrowing at the C5-C6 level and C6-C7 level.  She also has findings consistent with Chiari I malformation, but denies any history of headaches or balance issues.    Plan: For this left cervical radiculopathy, we will start her in physical therapy, refer her to pain management, and have her follow-up with her spine team as soon as possible.  She should go to the emergency department if her pain is not improving at all, or has any other concerning signs or symptoms especially headache, loss of balance, or red flag signs of spinal cord compression as discussed.  Will start her on naproxen.  This should be taken with food.  Additionally, discussed conservative treatment measures including rest, ice, elevation, compression, and over-the-counter analgesia as needed and as appropriate.  Risks of NSAID use, steroid use, and muscle relaxers discussed in depth and considered in light of medical comorbidities.  Patient, and parent/guardian as applicable, understand agree with plan.  Follow-up: With our spine team as soon as possible  X-rays on follow-up: None      Assessment:   Problem List Items Addressed This Visit    None  Visit Diagnoses         Cervical  radiculopathy    -  Primary    Relevant Medications    naproxen (Naprosyn) 500 mg tablet    Other Relevant Orders    Referral to Physical Therapy    Referral to Pain Management            Diagnostics: Reviewed all relevant imaging including x-ray, MRI, CT, and US.      Procedure:  Procedures    Physical Exam:  GENERAL:  No obvious acute distress.  NEURO:  Distally neurovascularly intact.  Sensation intact to light touch.  Extremity: Exam of the cervical spine:  No tenderness along the midline of the cervical spine;  No step-offs along the midline of the cervical spine;  Flexion is limited with pain;  Extension is limited with pain;  Sidebending is limited with pain;  Rotation is limited with pain;  Negative Spurling's on the right;  POSITIVE Spurling's on the left;  Full strength and range of motion throughout the bilateral upper extremities; and  Nonantalgic gait.    Orders Placed This Encounter    Referral to Physical Therapy    Referral to Pain Management    naproxen (Naprosyn) 500 mg tablet      At the conclusion of the visit there were no further questions by the patient/family regarding their plan of care.  Patient was instructed to call or return with any issues, questions, or concerns regarding their injury and/or treatment plan described above.     04/17/25 at 10:47 AM - Romie Khoury,     Office: [phone]    This note was prepared using voice recognition software.  The details of this note are correct and have been reviewed, and corrected to the best of my ability.  Some grammatical errors may persist related to the Dragon software.

## 2025-04-23 ENCOUNTER — TELEPHONE (OUTPATIENT)
Dept: PAIN MEDICINE | Facility: CLINIC | Age: 60
End: 2025-04-23
Payer: COMMERCIAL

## 2025-04-28 ENCOUNTER — APPOINTMENT (OUTPATIENT)
Dept: RADIOLOGY | Facility: HOSPITAL | Age: 60
End: 2025-04-28
Payer: COMMERCIAL

## 2025-04-29 ENCOUNTER — APPOINTMENT (OUTPATIENT)
Dept: ORTHOPEDIC SURGERY | Facility: CLINIC | Age: 60
End: 2025-04-29
Payer: COMMERCIAL

## 2025-07-11 VITALS — HEIGHT: 62 IN | WEIGHT: 154 LBS | BODY MASS INDEX: 28.34 KG/M2

## 2025-07-16 LAB
ANION GAP SERPL CALCULATED.4IONS-SCNC: 7 MMOL/L (CALC) (ref 7–17)
BUN SERPL-MCNC: 9 MG/DL (ref 7–25)
BUN/CREAT SERPL: 8 (CALC) (ref 6–22)
CALCIUM SERPL-MCNC: 8.8 MG/DL (ref 8.6–10.4)
CHLORIDE SERPL-SCNC: 110 MMOL/L (ref 98–110)
CO2 SERPL-SCNC: 25 MMOL/L (ref 20–32)
CREAT SERPL-MCNC: 1.14 MG/DL (ref 0.5–1.05)
CYSTATIN C SERPL-MCNC: 1.45 MG/L (ref 0.52–1.14)
EGFRCR SERPLBLD CKD-EPI 2021: 55 ML/MIN/1.73M2
FOLATE SERPL-MCNC: 14.5 NG/ML
GFR/BSA.PRED SERPLBLD CYS-BASED-ARV: 44 ML/MIN/1.73M2
GLUCOSE SERPL-MCNC: 96 MG/DL (ref 65–99)
POTASSIUM SERPL-SCNC: 4.3 MMOL/L (ref 3.5–5.3)
SODIUM SERPL-SCNC: 142 MMOL/L (ref 135–146)

## 2025-07-17 DIAGNOSIS — R94.4 DECREASED GFR: ICD-10-CM

## 2025-07-17 NOTE — PROGRESS NOTES
Bmp formatted / medication list updated to reflect Zepbound on hold until repeat BMP / nephrology appt .

## 2025-07-22 LAB
ANION GAP SERPL CALCULATED.4IONS-SCNC: 9 MMOL/L (CALC) (ref 7–17)
BUN SERPL-MCNC: 12 MG/DL (ref 7–25)
BUN/CREAT SERPL: ABNORMAL (CALC) (ref 6–22)
CALCIUM SERPL-MCNC: 9.1 MG/DL (ref 8.6–10.4)
CHLORIDE SERPL-SCNC: 113 MMOL/L (ref 98–110)
CO2 SERPL-SCNC: 23 MMOL/L (ref 20–32)
CREAT SERPL-MCNC: 1.04 MG/DL (ref 0.5–1.05)
EGFRCR SERPLBLD CKD-EPI 2021: 62 ML/MIN/1.73M2
GLUCOSE SERPL-MCNC: 90 MG/DL (ref 65–99)
POTASSIUM SERPL-SCNC: 5.1 MMOL/L (ref 3.5–5.3)
SODIUM SERPL-SCNC: 145 MMOL/L (ref 135–146)

## 2025-07-23 ENCOUNTER — APPOINTMENT (OUTPATIENT)
Dept: NEPHROLOGY | Facility: CLINIC | Age: 60
End: 2025-07-23
Payer: COMMERCIAL

## 2025-07-23 VITALS
WEIGHT: 153.2 LBS | BODY MASS INDEX: 28.02 KG/M2 | DIASTOLIC BLOOD PRESSURE: 73 MMHG | HEART RATE: 101 BPM | SYSTOLIC BLOOD PRESSURE: 102 MMHG

## 2025-07-23 DIAGNOSIS — R79.89 ELEVATED SERUM CREATININE: Primary | ICD-10-CM

## 2025-07-23 DIAGNOSIS — I10 ESSENTIAL HYPERTENSION: ICD-10-CM

## 2025-07-23 PROCEDURE — 3074F SYST BP LT 130 MM HG: CPT | Performed by: INTERNAL MEDICINE

## 2025-07-23 PROCEDURE — 99203 OFFICE O/P NEW LOW 30 MIN: CPT | Performed by: INTERNAL MEDICINE

## 2025-07-23 PROCEDURE — 3078F DIAST BP <80 MM HG: CPT | Performed by: INTERNAL MEDICINE

## 2025-07-23 NOTE — PROGRESS NOTES
Oly Richard   60 y.o.      Vitals:    25 1408   Weight: 69.5 kg (153 lb 3.2 oz)      MRN/Room: [id number]/Room/bed info not found      History Of Present Illness  Oly Richard is a 60 y.o. female presenting with high creatinine level.     Past Medical History  She has a past medical history of H/O bone density study (2025), H/O chest x-ray, H/O CT scan (2025), and H/O magnetic resonance imaging of cervical spine (04/15/2025).    Surgical History  She has a past surgical history that includes Other surgical history (2019); Hysterectomy (); Other surgical history (2019); Colonoscopy (2019); Esophagogastroduodenoscopy (10/12/2023); Colonoscopy (10/12/2023); and  section, low transverse (/98).     Social History  She reports that she has quit smoking. Her smoking use included cigarettes. She has a 7.5 pack-year smoking history. She has never used smokeless tobacco. She reports current alcohol use of about 2.0 standard drinks of alcohol per week. She reports that she does not use drugs.    Family History  Family History[1]     Allergies  Amlodipine, Dulaglutide, Nebivolol, Sertraline, Sulfa (sulfonamide antibiotics), and Sulfasalazine          Meds:     Current Medications[2]      ROS:  The patient is awake and oriented. No dizziness or lightheadedness. No chills and no fever. No headaches. No nausea and no vomiting. No shortness of breath. No cough. No chest pain. No abdominal pain. No diarrhea. No hematemesis or hemoptysis. No hematuria. No rectal bleeding. No melena. No epistaxis. No urinary symptoms. No flank pain. No leg edema. No leg pain. No itching. Overall, the rest of the review of systems is also negative.  12 point review of systems otherwise negative as stated in HPI.        Physical Exam:        Vitals:    25 1408   BP: 102/73   Pulse: 101     General: The patient is awake, oriented, and is not in any distress.  Head and Neck: Normocephalic.  No periorbital edema.  Eyes: Not icteric.   Respiratory: Symmetric chest expansion. No respiratory distress.  Skin: No maculopapular rash.  Musculoskeletal: No peripheral edema.  Neuro Exam: Speech is fluent. Moves extremities.        Blood Labs:  No results found for this or any previous visit (from the past 24 hours).   Lab Results   Component Value Date    GLUCOSE 90 07/21/2025    CALCIUM 9.1 07/21/2025     07/21/2025    K 5.1 07/21/2025    CO2 23 07/21/2025     (H) 07/21/2025    BUN 12 07/21/2025    CREATININE 1.04 07/21/2025       Imaging:        Assessment and Plan:  #1 high creatinine level.  The patient has history of hypertension but no diabetes.  She does not take nonsteroidal anti-inflammatory medications.  Probably chronic kidney disease stage II/III.  As per kidney ultrasound, microscopic urinalysis, and spot urine protein to creatinine ratio.    2.  Hypertension.  Blood pressure is under control.  Continue the current medications.    I will see her in about few weeks for follow-up.    Yobani Marvin MD  Senior Attending Physician  Director of Onco-Nephrology Program  Division of Nephrology & Hypertension  [facility]       [1]   Family History  Problem Relation Name Age of Onset    Arthritis Mother Julissa Perez     Alcohol abuse Father Vineet Perez     Heart disease Father Vineet Perez    [2]   Current Outpatient Medications   Medication Sig Dispense Refill    lacosamide (Vimpat) 100 mg tablet Take 1 tablet (100 mg) by mouth twice a day. 60 tablet 11    leflunomide (Arava) 20 mg tablet Take 1 tablet (20 mg) by mouth once daily.      olmesartan (BENIcar) 20 mg tablet Take 1 tablet (20 mg) by mouth 2 times a day. (Patient taking differently: Take 0.5 tablets (10 mg) by mouth once daily.) 180 tablet 3    predniSONE (Deltasone) 5 mg tablet TAKE UP TO 3 TABLETS DAILY AS NEEDED FOR SEVERE RHEUMATOID ARTHRITIS FLAREUPS. (Patient taking differently: Take 1  tablet (5 mg) by mouth once daily as needed.)      Rinvoq 15 mg tablet extended release 24 hr Take 1 tablet (15 mg) by mouth once daily.      rizatriptan (Maxalt) 10 mg tablet TAKE ONE (1) TABLET EVERY TWO (2) HOURS AS NEEDED FOR HEADACHE. MAXIMUM 30 MG PER DAY.      tirzepatide, weight loss, (Zepbound) 5 mg/0.5 mL injection Inject 5 mg under the skin every 7 days. 2 mL 11    Topamax 200 mg tablet TAKE 1 TABLET BY MOUTH TWICE DAILY. TAKE WITH 50 MG TABLET (TOTAL 250 MG TWICE DAILY) (Patient taking differently: Take 1 tablet (200 mg) by mouth 2 times a day.)      Topamax 50 mg tablet Take 1 tablet (50 mg) by mouth 2 times a day.      venlafaxine XR (Effexor-XR) 150 mg 24 hr capsule Take 1 capsule (150 mg) by mouth once daily. Take with food. 90 capsule 3    predniSONE (Deltasone) 20 mg tablet Take 3 tabs (60mg) daily for 3 days, then take 2 tabs (40mg) daily for 3 days, then take 1 tab (20mg) daily for 3 days. 18 tablet 0     No current facility-administered medications for this visit.

## 2025-08-04 ENCOUNTER — HOSPITAL ENCOUNTER (OUTPATIENT)
Dept: RADIOLOGY | Facility: HOSPITAL | Age: 60
Discharge: HOME | End: 2025-08-04
Payer: COMMERCIAL

## 2025-08-04 DIAGNOSIS — R79.89 ELEVATED SERUM CREATININE: ICD-10-CM

## 2025-08-04 DIAGNOSIS — I10 ESSENTIAL HYPERTENSION: ICD-10-CM

## 2025-08-04 PROCEDURE — 76770 US EXAM ABDO BACK WALL COMP: CPT | Performed by: RADIOLOGY

## 2025-08-04 PROCEDURE — 76770 US EXAM ABDO BACK WALL COMP: CPT

## 2025-08-05 LAB
ANION GAP SERPL CALCULATED.4IONS-SCNC: 10 MMOL/L (CALC) (ref 7–17)
BACTERIA #/AREA URNS HPF: ABNORMAL /HPF
BUN SERPL-MCNC: 14 MG/DL (ref 7–25)
BUN/CREAT SERPL: ABNORMAL (CALC) (ref 6–22)
CALCIUM SERPL-MCNC: 8.9 MG/DL (ref 8.6–10.4)
CHLORIDE SERPL-SCNC: 111 MMOL/L (ref 98–110)
CO2 SERPL-SCNC: 21 MMOL/L (ref 20–32)
CREAT SERPL-MCNC: 1.04 MG/DL (ref 0.5–1.05)
CREAT UR-MCNC: 17 MG/DL (ref 20–275)
EGFRCR SERPLBLD CKD-EPI 2021: 62 ML/MIN/1.73M2
GLUCOSE SERPL-MCNC: 83 MG/DL (ref 65–99)
HYALINE CASTS #/AREA URNS LPF: ABNORMAL /LPF
POTASSIUM SERPL-SCNC: 4.5 MMOL/L (ref 3.5–5.3)
PROT UR-MCNC: 9 MG/DL (ref 5–24)
PROT/CREAT UR: 0.53 MG/MG CREAT (ref 0.02–0.18)
PROT/CREAT UR: 529 MG/G CREAT (ref 24–184)
RBC #/AREA URNS HPF: ABNORMAL /HPF
SERVICE CMNT-IMP: ABNORMAL
SODIUM SERPL-SCNC: 142 MMOL/L (ref 135–146)
SQUAMOUS #/AREA URNS HPF: ABNORMAL /HPF
WBC #/AREA URNS HPF: ABNORMAL /HPF

## 2025-08-06 DIAGNOSIS — N20.0 NEPHROLITHIASIS: Primary | ICD-10-CM

## 2025-08-12 ENCOUNTER — APPOINTMENT (OUTPATIENT)
Dept: NEPHROLOGY | Facility: CLINIC | Age: 60
End: 2025-08-12
Payer: COMMERCIAL

## 2025-08-27 ENCOUNTER — PATIENT MESSAGE (OUTPATIENT)
Dept: PRIMARY CARE | Facility: CLINIC | Age: 60
End: 2025-08-27
Payer: COMMERCIAL

## 2025-08-27 DIAGNOSIS — E88.819 INSULIN RESISTANCE: ICD-10-CM

## 2025-08-27 DIAGNOSIS — R73.01 IFG (IMPAIRED FASTING GLUCOSE): ICD-10-CM

## 2025-08-28 DIAGNOSIS — R73.01 IFG (IMPAIRED FASTING GLUCOSE): ICD-10-CM

## 2025-08-28 DIAGNOSIS — E88.819 INSULIN RESISTANCE: ICD-10-CM

## 2025-08-28 RX ORDER — SEMAGLUTIDE 0.5 MG/.5ML
0.5 INJECTION, SOLUTION SUBCUTANEOUS WEEKLY
Qty: 2 ML | Refills: 0 | Status: SHIPPED | OUTPATIENT
Start: 2025-08-28 | End: 2025-09-19

## 2025-09-05 ENCOUNTER — APPOINTMENT (OUTPATIENT)
Dept: NEPHROLOGY | Facility: CLINIC | Age: 60
End: 2025-09-05
Payer: COMMERCIAL

## 2025-11-12 ENCOUNTER — APPOINTMENT (OUTPATIENT)
Dept: PRIMARY CARE | Facility: CLINIC | Age: 60
End: 2025-11-12
Payer: COMMERCIAL

## 2026-02-27 ENCOUNTER — APPOINTMENT (OUTPATIENT)
Dept: PRIMARY CARE | Facility: CLINIC | Age: 61
End: 2026-02-27
Payer: COMMERCIAL